# Patient Record
Sex: FEMALE | Race: WHITE | NOT HISPANIC OR LATINO | URBAN - NONMETROPOLITAN AREA
[De-identification: names, ages, dates, MRNs, and addresses within clinical notes are randomized per-mention and may not be internally consistent; named-entity substitution may affect disease eponyms.]

---

## 2023-09-03 ENCOUNTER — HOSPITAL ENCOUNTER (INPATIENT)
Facility: HOSPITAL | Age: 68
LOS: 4 days | Discharge: HOSPICE | DRG: 193 | End: 2023-09-07
Attending: EMERGENCY MEDICINE | Admitting: INTERNAL MEDICINE
Payer: COMMERCIAL

## 2023-09-03 ENCOUNTER — APPOINTMENT (OUTPATIENT)
Dept: CT IMAGING | Facility: HOSPITAL | Age: 68
DRG: 193 | End: 2023-09-03
Attending: EMERGENCY MEDICINE
Payer: COMMERCIAL

## 2023-09-03 DIAGNOSIS — J18.9 PNEUMONIA OF BOTH LUNGS DUE TO INFECTIOUS ORGANISM, UNSPECIFIED PART OF LUNG: ICD-10-CM

## 2023-09-03 LAB
ALBUMIN SERPL BCG-MCNC: 3.5 G/DL (ref 3.5–5.2)
ALP SERPL-CCNC: 101 U/L (ref 35–104)
ALT SERPL W P-5'-P-CCNC: 21 U/L (ref 0–50)
ANION GAP SERPL CALCULATED.3IONS-SCNC: 13 MMOL/L (ref 7–15)
AST SERPL W P-5'-P-CCNC: 26 U/L (ref 0–45)
BASE EXCESS BLDV CALC-SCNC: 1.5 MMOL/L (ref -7.7–1.9)
BASOPHILS # BLD AUTO: 0.1 10E3/UL (ref 0–0.2)
BASOPHILS NFR BLD AUTO: 1 %
BILIRUB SERPL-MCNC: 0.2 MG/DL
BUN SERPL-MCNC: 9.3 MG/DL (ref 8–23)
CALCIUM SERPL-MCNC: 9.4 MG/DL (ref 8.8–10.2)
CHLORIDE SERPL-SCNC: 100 MMOL/L (ref 98–107)
CREAT SERPL-MCNC: 0.88 MG/DL (ref 0.51–0.95)
DEPRECATED HCO3 PLAS-SCNC: 25 MMOL/L (ref 22–29)
EOSINOPHIL # BLD AUTO: 0.2 10E3/UL (ref 0–0.7)
EOSINOPHIL NFR BLD AUTO: 2 %
ERYTHROCYTE [DISTWIDTH] IN BLOOD BY AUTOMATED COUNT: 12.9 % (ref 10–15)
EST. AVERAGE GLUCOSE BLD GHB EST-MCNC: 166 MG/DL
GFR SERPL CREATININE-BSD FRML MDRD: 70 ML/MIN/1.73M2
GLUCOSE SERPL-MCNC: 214 MG/DL (ref 70–99)
HBA1C MFR BLD: 7.4 %
HCO3 BLDV-SCNC: 28 MMOL/L (ref 21–28)
HCT VFR BLD AUTO: 39.6 % (ref 35–47)
HGB BLD-MCNC: 13.3 G/DL (ref 11.7–15.7)
HOLD SPECIMEN: NORMAL
IMM GRANULOCYTES # BLD: 0.1 10E3/UL
IMM GRANULOCYTES NFR BLD: 1 %
INR PPP: 0.99 (ref 0.85–1.15)
LYMPHOCYTES # BLD AUTO: 0.9 10E3/UL (ref 0.8–5.3)
LYMPHOCYTES NFR BLD AUTO: 7 %
MCH RBC QN AUTO: 30.2 PG (ref 26.5–33)
MCHC RBC AUTO-ENTMCNC: 33.6 G/DL (ref 31.5–36.5)
MCV RBC AUTO: 90 FL (ref 78–100)
MONOCYTES # BLD AUTO: 0.4 10E3/UL (ref 0–1.3)
MONOCYTES NFR BLD AUTO: 3 %
NEUTROPHILS # BLD AUTO: 11.2 10E3/UL (ref 1.6–8.3)
NEUTROPHILS NFR BLD AUTO: 86 %
NRBC # BLD AUTO: 0 10E3/UL
NRBC BLD AUTO-RTO: 0 /100
NT-PROBNP SERPL-MCNC: 70 PG/ML (ref 0–900)
O2/TOTAL GAS SETTING VFR VENT: 28 %
OXYHGB MFR BLDV: 22 % (ref 70–75)
PCO2 BLDV: 53 MM HG (ref 40–50)
PH BLDV: 7.34 [PH] (ref 7.32–7.43)
PLATELET # BLD AUTO: 329 10E3/UL (ref 150–450)
PO2 BLDV: 18 MM HG (ref 25–47)
POTASSIUM SERPL-SCNC: 4.1 MMOL/L (ref 3.4–5.3)
PROT SERPL-MCNC: 7.1 G/DL (ref 6.4–8.3)
RBC # BLD AUTO: 4.4 10E6/UL (ref 3.8–5.2)
SARS-COV-2 RNA RESP QL NAA+PROBE: NEGATIVE
SODIUM SERPL-SCNC: 138 MMOL/L (ref 136–145)
TROPONIN T SERPL HS-MCNC: <6 NG/L
WBC # BLD AUTO: 12.8 10E3/UL (ref 4–11)

## 2023-09-03 PROCEDURE — 36415 COLL VENOUS BLD VENIPUNCTURE: CPT | Performed by: EMERGENCY MEDICINE

## 2023-09-03 PROCEDURE — 82805 BLOOD GASES W/O2 SATURATION: CPT | Performed by: EMERGENCY MEDICINE

## 2023-09-03 PROCEDURE — 96376 TX/PRO/DX INJ SAME DRUG ADON: CPT

## 2023-09-03 PROCEDURE — 250N000011 HC RX IP 250 OP 636: Mod: JZ | Performed by: INTERNAL MEDICINE

## 2023-09-03 PROCEDURE — 87899 AGENT NOS ASSAY W/OPTIC: CPT | Performed by: INTERNAL MEDICINE

## 2023-09-03 PROCEDURE — 250N000011 HC RX IP 250 OP 636: Performed by: EMERGENCY MEDICINE

## 2023-09-03 PROCEDURE — 85610 PROTHROMBIN TIME: CPT | Performed by: EMERGENCY MEDICINE

## 2023-09-03 PROCEDURE — 71275 CT ANGIOGRAPHY CHEST: CPT

## 2023-09-03 PROCEDURE — 96361 HYDRATE IV INFUSION ADD-ON: CPT

## 2023-09-03 PROCEDURE — 87635 SARS-COV-2 COVID-19 AMP PRB: CPT | Performed by: EMERGENCY MEDICINE

## 2023-09-03 PROCEDURE — 93005 ELECTROCARDIOGRAM TRACING: CPT

## 2023-09-03 PROCEDURE — 87040 BLOOD CULTURE FOR BACTERIA: CPT | Performed by: EMERGENCY MEDICINE

## 2023-09-03 PROCEDURE — 99285 EMERGENCY DEPT VISIT HI MDM: CPT | Mod: 25

## 2023-09-03 PROCEDURE — 258N000003 HC RX IP 258 OP 636: Performed by: EMERGENCY MEDICINE

## 2023-09-03 PROCEDURE — 85025 COMPLETE CBC W/AUTO DIFF WBC: CPT | Performed by: EMERGENCY MEDICINE

## 2023-09-03 PROCEDURE — 83880 ASSAY OF NATRIURETIC PEPTIDE: CPT | Performed by: EMERGENCY MEDICINE

## 2023-09-03 PROCEDURE — 93010 ELECTROCARDIOGRAM REPORT: CPT | Performed by: INTERNAL MEDICINE

## 2023-09-03 PROCEDURE — 99285 EMERGENCY DEPT VISIT HI MDM: CPT | Performed by: EMERGENCY MEDICINE

## 2023-09-03 PROCEDURE — 96374 THER/PROPH/DIAG INJ IV PUSH: CPT

## 2023-09-03 PROCEDURE — 120N000001 HC R&B MED SURG/OB

## 2023-09-03 PROCEDURE — 99222 1ST HOSP IP/OBS MODERATE 55: CPT | Mod: AI | Performed by: INTERNAL MEDICINE

## 2023-09-03 PROCEDURE — 83036 HEMOGLOBIN GLYCOSYLATED A1C: CPT | Performed by: INTERNAL MEDICINE

## 2023-09-03 PROCEDURE — 250N000013 HC RX MED GY IP 250 OP 250 PS 637: Performed by: INTERNAL MEDICINE

## 2023-09-03 PROCEDURE — 258N000003 HC RX IP 258 OP 636: Performed by: INTERNAL MEDICINE

## 2023-09-03 PROCEDURE — 80053 COMPREHEN METABOLIC PANEL: CPT | Performed by: EMERGENCY MEDICINE

## 2023-09-03 PROCEDURE — C9803 HOPD COVID-19 SPEC COLLECT: HCPCS

## 2023-09-03 PROCEDURE — 84484 ASSAY OF TROPONIN QUANT: CPT | Performed by: EMERGENCY MEDICINE

## 2023-09-03 RX ORDER — LIDOCAINE 40 MG/G
CREAM TOPICAL
Status: DISCONTINUED | OUTPATIENT
Start: 2023-09-03 | End: 2023-09-07 | Stop reason: HOSPADM

## 2023-09-03 RX ORDER — PRAVASTATIN SODIUM 10 MG
10 TABLET ORAL DAILY
Status: DISCONTINUED | OUTPATIENT
Start: 2023-09-04 | End: 2023-09-07 | Stop reason: HOSPADM

## 2023-09-03 RX ORDER — IOPAMIDOL 755 MG/ML
90 INJECTION, SOLUTION INTRAVASCULAR ONCE
Status: COMPLETED | OUTPATIENT
Start: 2023-09-03 | End: 2023-09-03

## 2023-09-03 RX ORDER — CEFTRIAXONE SODIUM 1 G/50ML
1 INJECTION, SOLUTION INTRAVENOUS EVERY 24 HOURS
Status: DISCONTINUED | OUTPATIENT
Start: 2023-09-04 | End: 2023-09-04

## 2023-09-03 RX ORDER — TRAZODONE HYDROCHLORIDE 100 MG/1
100 TABLET ORAL AT BEDTIME
Status: DISCONTINUED | OUTPATIENT
Start: 2023-09-03 | End: 2023-09-03

## 2023-09-03 RX ORDER — SODIUM CHLORIDE 9 MG/ML
INJECTION, SOLUTION INTRAVENOUS CONTINUOUS
Status: DISCONTINUED | OUTPATIENT
Start: 2023-09-03 | End: 2023-09-06

## 2023-09-03 RX ORDER — CLONAZEPAM 0.5 MG/1
0.5 TABLET ORAL AT BEDTIME
Status: ON HOLD | COMMUNITY
End: 2023-09-03

## 2023-09-03 RX ORDER — FUROSEMIDE 20 MG
1 TABLET ORAL
COMMUNITY
Start: 2023-01-24

## 2023-09-03 RX ORDER — ONDANSETRON 2 MG/ML
4 INJECTION INTRAMUSCULAR; INTRAVENOUS EVERY 6 HOURS PRN
Status: DISCONTINUED | OUTPATIENT
Start: 2023-09-03 | End: 2023-09-07 | Stop reason: HOSPADM

## 2023-09-03 RX ORDER — FUROSEMIDE 20 MG
20 TABLET ORAL DAILY
Status: DISCONTINUED | OUTPATIENT
Start: 2023-09-03 | End: 2023-09-07 | Stop reason: HOSPADM

## 2023-09-03 RX ORDER — PANTOPRAZOLE SODIUM 40 MG/1
40 TABLET, DELAYED RELEASE ORAL 2 TIMES DAILY
Status: DISCONTINUED | OUTPATIENT
Start: 2023-09-04 | End: 2023-09-07 | Stop reason: HOSPADM

## 2023-09-03 RX ORDER — KETOROLAC TROMETHAMINE 15 MG/ML
15 INJECTION, SOLUTION INTRAMUSCULAR; INTRAVENOUS EVERY 6 HOURS PRN
Status: DISCONTINUED | OUTPATIENT
Start: 2023-09-03 | End: 2023-09-07 | Stop reason: HOSPADM

## 2023-09-03 RX ORDER — TEMAZEPAM 30 MG
30 CAPSULE ORAL
COMMUNITY

## 2023-09-03 RX ORDER — LEVOTHYROXINE SODIUM 50 UG/1
50 TABLET ORAL
Status: DISCONTINUED | OUTPATIENT
Start: 2023-09-04 | End: 2023-09-07 | Stop reason: HOSPADM

## 2023-09-03 RX ORDER — HYDROXYZINE HYDROCHLORIDE 25 MG/1
50 TABLET, FILM COATED ORAL 4 TIMES DAILY PRN
COMMUNITY

## 2023-09-03 RX ORDER — LEVOTHYROXINE SODIUM 50 UG/1
1 TABLET ORAL
COMMUNITY
Start: 2023-03-06

## 2023-09-03 RX ORDER — LANOLIN ALCOHOL/MO/W.PET/CERES
1000 CREAM (GRAM) TOPICAL EVERY OTHER DAY
COMMUNITY

## 2023-09-03 RX ORDER — PRAVASTATIN SODIUM 10 MG
1 TABLET ORAL EVERY MORNING
COMMUNITY
Start: 2023-01-24

## 2023-09-03 RX ORDER — CLONAZEPAM 0.5 MG/1
0.5 TABLET ORAL AT BEDTIME
Status: DISCONTINUED | OUTPATIENT
Start: 2023-09-03 | End: 2023-09-03

## 2023-09-03 RX ORDER — VENLAFAXINE HYDROCHLORIDE 150 MG/1
2 CAPSULE, EXTENDED RELEASE ORAL
COMMUNITY
Start: 2023-08-30

## 2023-09-03 RX ORDER — OMEPRAZOLE 40 MG/1
40 CAPSULE, DELAYED RELEASE ORAL EVERY MORNING
COMMUNITY
Start: 2023-01-24

## 2023-09-03 RX ORDER — NICOTINE 21 MG/24HR
1 PATCH, TRANSDERMAL 24 HOURS TRANSDERMAL DAILY PRN
Status: DISCONTINUED | OUTPATIENT
Start: 2023-09-03 | End: 2023-09-07 | Stop reason: HOSPADM

## 2023-09-03 RX ORDER — PROPRANOLOL HYDROCHLORIDE 80 MG/1
80 CAPSULE, EXTENDED RELEASE ORAL DAILY
Status: DISCONTINUED | OUTPATIENT
Start: 2023-09-04 | End: 2023-09-07 | Stop reason: HOSPADM

## 2023-09-03 RX ORDER — PROPRANOLOL HYDROCHLORIDE 80 MG/1
80 CAPSULE, EXTENDED RELEASE ORAL EVERY MORNING
COMMUNITY
Start: 2023-01-24

## 2023-09-03 RX ORDER — ENOXAPARIN SODIUM 100 MG/ML
40 INJECTION SUBCUTANEOUS EVERY 24 HOURS
Status: DISCONTINUED | OUTPATIENT
Start: 2023-09-03 | End: 2023-09-07 | Stop reason: HOSPADM

## 2023-09-03 RX ORDER — VENLAFAXINE HYDROCHLORIDE 150 MG/1
300 CAPSULE, EXTENDED RELEASE ORAL DAILY
Status: DISCONTINUED | OUTPATIENT
Start: 2023-09-04 | End: 2023-09-07 | Stop reason: HOSPADM

## 2023-09-03 RX ORDER — FENTANYL CITRATE 50 UG/ML
50 INJECTION, SOLUTION INTRAMUSCULAR; INTRAVENOUS ONCE
Status: COMPLETED | OUTPATIENT
Start: 2023-09-03 | End: 2023-09-03

## 2023-09-03 RX ORDER — CLOMIPRAMINE HYDROCHLORIDE 50 MG/1
100 CAPSULE ORAL AT BEDTIME
COMMUNITY

## 2023-09-03 RX ORDER — TRAZODONE HYDROCHLORIDE 100 MG/1
100 TABLET ORAL AT BEDTIME
Status: ON HOLD | COMMUNITY
End: 2023-09-03

## 2023-09-03 RX ORDER — CEFTRIAXONE SODIUM 1 G/50ML
1 INJECTION, SOLUTION INTRAVENOUS ONCE
Status: COMPLETED | OUTPATIENT
Start: 2023-09-03 | End: 2023-09-03

## 2023-09-03 RX ORDER — CALCIUM CARBONATE/VITAMIN D3 600 MG-10
2 TABLET ORAL DAILY
Status: ON HOLD | COMMUNITY
End: 2023-09-05

## 2023-09-03 RX ORDER — ONDANSETRON 4 MG/1
4 TABLET, ORALLY DISINTEGRATING ORAL EVERY 6 HOURS PRN
Status: DISCONTINUED | OUTPATIENT
Start: 2023-09-03 | End: 2023-09-07 | Stop reason: HOSPADM

## 2023-09-03 RX ORDER — ACETAMINOPHEN 650 MG/1
650 SUPPOSITORY RECTAL EVERY 6 HOURS PRN
Status: DISCONTINUED | OUTPATIENT
Start: 2023-09-03 | End: 2023-09-07 | Stop reason: HOSPADM

## 2023-09-03 RX ORDER — ACETAMINOPHEN 325 MG/1
650 TABLET ORAL EVERY 6 HOURS PRN
Status: DISCONTINUED | OUTPATIENT
Start: 2023-09-03 | End: 2023-09-07 | Stop reason: HOSPADM

## 2023-09-03 RX ADMIN — SODIUM CHLORIDE: 9 INJECTION, SOLUTION INTRAVENOUS at 14:45

## 2023-09-03 RX ADMIN — KETOROLAC TROMETHAMINE 15 MG: 15 INJECTION, SOLUTION INTRAMUSCULAR; INTRAVENOUS at 19:31

## 2023-09-03 RX ADMIN — FENTANYL CITRATE 50 MCG: 50 INJECTION, SOLUTION INTRAMUSCULAR; INTRAVENOUS at 14:36

## 2023-09-03 RX ADMIN — ENOXAPARIN SODIUM 40 MG: 40 INJECTION SUBCUTANEOUS at 18:22

## 2023-09-03 RX ADMIN — IOPAMIDOL 90 ML: 755 INJECTION, SOLUTION INTRAVENOUS at 14:59

## 2023-09-03 RX ADMIN — ACETAMINOPHEN 650 MG: 325 TABLET, FILM COATED ORAL at 19:34

## 2023-09-03 RX ADMIN — CEFTRIAXONE SODIUM 1 G: 1 INJECTION, SOLUTION INTRAVENOUS at 17:07

## 2023-09-03 RX ADMIN — FENTANYL CITRATE 50 MCG: 50 INJECTION, SOLUTION INTRAMUSCULAR; INTRAVENOUS at 15:26

## 2023-09-03 RX ADMIN — AZITHROMYCIN 500 MG: 500 INJECTION, POWDER, LYOPHILIZED, FOR SOLUTION INTRAVENOUS at 18:15

## 2023-09-03 ASSESSMENT — ACTIVITIES OF DAILY LIVING (ADL)
ADLS_ACUITY_SCORE: 35
ADLS_ACUITY_SCORE: 35
ADLS_ACUITY_SCORE: 34
ADLS_ACUITY_SCORE: 34
ADLS_ACUITY_SCORE: 28

## 2023-09-03 ASSESSMENT — ENCOUNTER SYMPTOMS
NEUROLOGICAL NEGATIVE: 1
SHORTNESS OF BREATH: 1
ENDOCRINE NEGATIVE: 1
MUSCULOSKELETAL NEGATIVE: 1
HEMATOLOGIC/LYMPHATIC NEGATIVE: 1
GASTROINTESTINAL NEGATIVE: 1
EYES NEGATIVE: 1
FEVER: 1

## 2023-09-03 NOTE — ED TRIAGE NOTES
C/o left sided chest pain rated 9/10 that increases with coughing and deep breath. States coughing isn't more than usual due to being a smoker but the pain in her left chest makes it difficult to breathe and cough. Yells out and grabs left chest when coughing. SpO2 88% on RA.  mg and nitroglycerin SL given by EMS prior to arrival. Changed into gown. Cardiac monitor in place and shows a ST in the 100's. Reports she had a PE 15 years ago that required subcutaneous lovenox. Call light within reach.       Triage Assessment       Row Name 09/03/23 0829       Triage Assessment (Adult)    Airway WDL WDL       Respiratory WDL    Respiratory WDL X;rhythm/pattern    Rhythm/Pattern, Respiratory shortness of breath;tachypneic;shallow

## 2023-09-03 NOTE — H&P
[facility]    History and Physical  Hospitalist       Date of Admission:  9/3/2023  Date of Service (when I saw the patient): 09/03/23    Assessment & Plan   Tianna Lobato is a 68 year old female who presents with chest pain, shortness of breath    Community-acquired pneumonia: Resultant sepsis.  CTA chest with right-sided opacities, left-sided opacities as well as moderate pleural effusion.  COVID-negative.  Patient is a smoker, but no formal diagnosis of COPD has ever been made.  Tmax 100.7, WBC 12.8.  -Blood cultures drawn in the emergency department, will follow  -Empiric ceftriaxone, azithromycin  -Legionella, strep pneumo antigen  -Mycoplasma  -Supplemental oxygen as needed, wean as able  -Follow-up scan when improved, perhaps diagnostic thoracentesis on the left    Essential hypertension: Patient is on Inderal LA 80 mg.  -Continue as able    Bilateral lower leg swelling: Patient is on Lasix 20 mg daily  -We will hold currently in the setting of acute infection    Anxiety/depression: Continue home Effexor, clonazepam    Hyperlipidemia: Continue home statin    Hypothyroidism: Continue home Synthroid    Tobacco dependence: Nicotine replacement as needed    FEN: Oral diet as tolerated.  Electrolytes within normal limits.    Clinically Significant Risk Factors Present on Admission                                    DVT Prophylaxis: Enoxaparin (Lovenox) SQ    Code Status: Full Code    Disposition: Expected discharge in 2-3 days once clinically improved.    Kamini Roach MD, MD        Primary Care Physician   No primary care provider on file.    Chief Complaint   Chest/back pain    History is obtained from the patient    History of Present Illness   Tianna Lobato is a 68 year old female with history of hypertension, hyperlipidemia, hypothyroidism tobacco dependence, obesity, as well as pulmonary embolism about 10 years ago but no longer on anticoagulation who presents with chest pain and  shortness of breath for about 2 and half weeks.  Patient complains of back pain that is most prominent.  She admits to shortness of breath only on questioning.  She states that she is a smoker, less than a pack a day.  She does not have a formal diagnosis of COPD however.  She had trouble getting into the ambulance today because of shortness of breath.  In the emergency department, patient was found to be somewhat hypoxic 80% on room air.  Tmax measured here 100.7, and patient acknowledges measuring at home as high as 103.  She states that she got back from [facility] about a week ago.  With history of PE, CTA was gotten empirically which was negative for new pulmonary embolism.  It did show moderate left-sided effusion as well as opacities right upper and right lower lobes.  With mild hypoxia, patient admitted for possible community-acquired pneumonia.  She denies any confirmed sick contacts, although she did attend a large wedding a few days ago.      Past Medical History    I have reviewed this patient's medical history and updated it with pertinent information if needed.   No past medical history on file.    Past Surgical History   I have reviewed this patient's surgical history and updated it with pertinent information if needed.  No past surgical history on file.    Prior to Admission Medications   Prior to Admission Medications   Prescriptions Last Dose Informant Patient Reported? Taking?   clonazePAM (KLONOPIN) 0.5 MG tablet   Yes No   Sig: Take 0.5 mg by mouth At Bedtime   furosemide (LASIX) 20 MG tablet   Yes Yes   Sig: Take 1 tablet by mouth daily   levothyroxine (SYNTHROID/LEVOTHROID) 50 MCG tablet   Yes Yes   Sig: Take 1 tablet by mouth daily before breakfast   omeprazole (PRILOSEC) 40 MG DR capsule   Yes Yes   Sig: Take 40 mg by mouth daily   pravastatin (PRAVACHOL) 10 MG tablet   Yes Yes   Sig: Take 1 tablet by mouth daily   propranolol ER (INDERAL LA) 80 MG 24 hr capsule   Yes Yes   Sig: Take  80 mg by mouth daily   traZODone (DESYREL) 100 MG tablet   Yes No   Sig: Take 100 mg by mouth At Bedtime   venlafaxine (EFFEXOR XR) 150 MG 24 hr capsule   Yes Yes   Sig: Take 2 capsules by mouth daily      Facility-Administered Medications: None     Allergies   Allergies   Allergen Reactions    Sulfa Antibiotics Hives       Social History   I have reviewed this patient's social history and updated it with pertinent information if needed. Tianna Lobato      Family History   I have reviewed this patient's family history and updated it with pertinent information if needed.   No family history on file.    Review of Systems   The 10 point Review of Systems is negative other than noted in the HPI or here.     Physical Exam   Temp: (!) 100.7  F (38.2  C) Temp src: Tympanic BP: 133/71 Pulse: 111   Resp: 21 SpO2: (!) 91 % O2 Device: None (Room air) Oxygen Delivery: 2 LPM  Vital Signs with Ranges  Temp:  [100.7  F (38.2  C)] 100.7  F (38.2  C)  Pulse:  [] 111  Resp:  [21-26] 21  BP: (112-133)/(64-71) 133/71  SpO2:  [88 %-96 %] 91 %  196 lbs 6.88 oz    Constitutional: AA, NAD, obese  Eyes: PERRLA, no injection, no icterus  HEENT: atraumatic, normocephalic  Respiratory: relatively CTA b/l  Cardiovascular: S1 S2 RRR  GI: soft, NT, ND, + bowel sounds  Lymph/Hematologic: no palpable lymphadenopathy  Skin: no rashes, no lesions  Musculoskeletal: No LE edema, good tone, no deformities  Neurologic: oriented x 3, no focal deficits  Psychiatric: appropriate affect      Data   Data reviewed today:  I personally reviewed imaging reports.  Recent Labs   Lab 09/03/23  1407   WBC 12.8*   HGB 13.3   MCV 90      INR 0.99      POTASSIUM 4.1   CHLORIDE 100   CO2 25   BUN 9.3   CR 0.88   ANIONGAP 13   MESSI 9.4   *   ALBUMIN 3.5   PROTTOTAL 7.1   BILITOTAL 0.2   ALKPHOS 101   ALT 21   AST 26          No results found for this or any previous visit (from the past 24 hour(s)).

## 2023-09-03 NOTE — ED PROVIDER NOTES
"  History     Chief Complaint   Patient presents with    Chest Pain     HPI  Tianna Lobato is a 68 year old female who is transported by EMS complaining of left sided chest pain which started earlier today.  States the pain radiates into left back and left neck.  Has been happening intermittently for past few days.  Patient states thought it was a \"muscle spasm\".  Pain is very sharp.  Worse with cough and deep inspiration.  Patient also states has recently been short of breath.  Has a history of PE.  Denies dizziness or lightheadedness.  She denies abdominal pain.  She had no nausea or vomiting.  She has no change in her bowel or bladder habits.  Patient relates that she has developed some fevers and chills as well.    Allergies:  Allergies   Allergen Reactions    Sulfa Antibiotics Hives       Problem List:    Patient Active Problem List    Diagnosis Date Noted    Pneumonia of both lungs due to infectious organism, unspecified part of lung 09/03/2023     Priority: Medium        Past Medical History: Asthma, carpal tunnel syndrome, chest pain, back pain, depression, reflux, anxiety, breast cancer, pulmonary embolus, osteoarthritis, pernicious anemia, pulmonary nodule, reactive airway disease      Past Surgical History:    No past surgical history on file.    Family History:    No family history on file.    Social History:  Marital Status:   [2]        Medications:    furosemide (LASIX) 20 MG tablet  levothyroxine (SYNTHROID/LEVOTHROID) 50 MCG tablet  omeprazole (PRILOSEC) 40 MG DR capsule  pravastatin (PRAVACHOL) 10 MG tablet  propranolol ER (INDERAL LA) 80 MG 24 hr capsule  venlafaxine (EFFEXOR XR) 150 MG 24 hr capsule  clonazePAM (KLONOPIN) 0.5 MG tablet  traZODone (DESYREL) 100 MG tablet          Review of Systems   Constitutional:  Positive for fever.   HENT: Negative.     Eyes: Negative.    Respiratory:  Positive for shortness of breath.    Cardiovascular:  Positive for chest pain.   Gastrointestinal: " Negative.    Endocrine: Negative.    Genitourinary: Negative.    Musculoskeletal: Negative.    Neurological: Negative.    Hematological: Negative.    All other systems reviewed and found unremarkable    Physical Exam   BP: 112/64  Pulse: 103  Temp: (!) 100.7  F (38.2  C)  Resp: 26  Weight: 89.1 kg (196 lb 6.9 oz)  SpO2: (!) 88 %      Physical Exam 68-year-old female who is awake alert oriented person place and time.  She is pleasant and cooperative with my exam but does appear uncomfortable at rest.  HEENT normocephalic extraocular muscles intact pupils equally round and active light and oropharynx is clear.  neck supple full range of motion without pain.  Lungs are slightly diminished bilaterally.  No wheezes.  Heart maintains a regular rate and rhythm S1 and S2 sounds are appreciated.  The abdomen is soft no rebound or involuntary guarding.  Extremities a full range of motion 5/5 strength.  Brisk peripheral pulses brisk capillary refill no sensory deficit.  Neurologic exam cranial nerves II through XII are grossly intact.  No facial asymmetry.  No focal deficit.  Dermatologic exam no diffuse skin rashes or lesions are noted.    ED Course        EKG is obtained and interpreted by myself.  Shows a normal sinus rhythm.  Ventricular rate is 99 bpm.  MA interval is 142 ms.  Corrected QT is 459 ms.  There is no ST segment elevation or depression.  There is no inappropriate T wave inversion.  There does not appear to be evidence of acute ischemia      ED Course as of 09/03/23 1548   Sun Sep 03, 2023   1543 Stable throughout her stay in the emergency department.  I discussed lab and CT findings with the patient and her family.  I then discussed the case with Dr. Roach, call hospitalist who very graciously agreed to admit the patient to his service.                         Results for orders placed or performed during the hospital encounter of 09/03/23 (from the past 24 hour(s))   [place] Draw    Narrative    The following  orders were created for panel order [place] Draw.  Procedure                               Abnormality         Status                     ---------                               -----------         ------                     Extra Blue Top Tube[[id number]]                              Final result               Extra Red Top Tube[[id number]]                                                          Extra Green Top (Lithium...[[id number]]                      Final result               Extra Purple Top Tube[[id number]]                            Final result               Extra Green Top (Lithium...[[id number]]                      Final result                 Please view results for these tests on the individual orders.   Extra Blue Top Tube   Result Value Ref [facility]    Hold Specimen JIC    Extra Green Top (Lithium Heparin) Tube   Result Value Ref Range    Hold Specimen JIC    Extra Purple Top Tube   Result Value Ref [facility]    Hold Specimen JIC    Extra Green Top (Lithium Heparin) ON ICE   Result Value Ref [facility]    Hold Specimen JIC    CBC with platelets differential    Narrative    The following orders were created for panel order CBC with platelets differential.  Procedure                               Abnormality         Status                     ---------                               -----------         ------                     CBC with platelets and d...[[id number]]  Abnormal            Final result                 Please view results for these tests on the individual orders.   INR   Result Value Ref [facility]    INR 0.99 0.85 - 1.15   Comprehensive metabolic panel   Result Value Ref [facility]    Sodium 138 136 - 145 mmol/L    Potassium 4.1 3.4 - 5.3 mmol/L    Chloride 100 98 - 107 mmol/L    Carbon Dioxide (CO2) 25 22 - 29 mmol/L    Anion Gap 13 7 - 15 mmol/L    Urea Nitrogen 9.3 8.0 - 23.0 mg/dL    Creatinine 0.88 0.51 - 0.95 mg/dL    Calcium 9.4 8.8 - 10.2 mg/dL    Glucose 214 (H) 70 - 99 mg/dL    Alkaline Phosphatase 101  35 - 104 U/L    AST 26 0 - 45 U/L    ALT 21 0 - 50 U/L    Protein Total 7.1 6.4 - 8.3 g/dL    Albumin 3.5 3.5 - 5.2 g/dL    Bilirubin Total 0.2 <=1.2 mg/dL    GFR Estimate 70 >60 mL/min/1.73m2   Troponin T, High Sensitivity   Result Value Ref [facility]    Troponin T, High Sensitivity <6 <=14 ng/L   Nt probnp inpatient (BNP)   Result Value Ref Range    N terminal Pro BNP Inpatient 70 0 - 900 pg/mL   CBC with platelets and differential   Result Value Ref Range    WBC Count 12.8 (H) 4.0 - 11.0 10e3/uL    RBC Count 4.40 3.80 - 5.20 10e6/uL    Hemoglobin 13.3 11.7 - 15.7 g/dL    Hematocrit 39.6 35.0 - 47.0 %    MCV 90 78 - 100 fL    MCH 30.2 26.5 - 33.0 pg    MCHC 33.6 31.5 - 36.5 g/dL    RDW 12.9 10.0 - 15.0 %    Platelet Count 329 150 - 450 10e3/uL    % Neutrophils 86 %    % Lymphocytes 7 %    % Monocytes 3 %    % Eosinophils 2 %    % Basophils 1 %    % Immature Granulocytes 1 %    NRBCs per 100 WBC 0 <1 /100    Absolute Neutrophils 11.2 (H) 1.6 - 8.3 10e3/uL    Absolute Lymphocytes 0.9 0.8 - 5.3 10e3/uL    Absolute Monocytes 0.4 0.0 - 1.3 10e3/uL    Absolute Eosinophils 0.2 0.0 - 0.7 10e3/uL    Absolute Basophils 0.1 0.0 - 0.2 10e3/uL    Absolute Immature Granulocytes 0.1 <=0.4 10e3/uL    Absolute NRBCs 0.0 10e3/uL   Blood gas venous and oxyhgb   Result Value Ref [facility]    pH Venous 7.34 7.32 - 7.43    pCO2 Venous 53 (H) 40 - 50 mm Hg    pO2 Venous 18 (L) 25 - 47 mm Hg    Bicarbonate Venous 28 21 - 28 mmol/L    FIO2 28     Oxyhemoglobin Venous 22 (L) 70 - 75 %    Base Excess/Deficit 1.5 -7.7 - 1.9 mmol/L   Symptomatic COVID-19 Virus (Coronavirus) by PCR Nasopharyngeal    Specimen: Nasopharyngeal; Swab   Result Value Ref [facility]    SARS CoV2 PCR Negative Negative    Narrative    Testing was performed using the Xpert Xpress SARS-CoV-2 Assay on the Cepheid Gene-Xpert Instrument Systems. Additional information about this Emergency Use Authorization (EUA) assay can be found via the Lab Guide. This test should be ordered for the  detection of SARS-CoV-2 in individuals who meet SARS-CoV-2 clinical and/or epidemiological criteria as well as from individuals without symptoms or other reasons to suspect COVID-19. Test performance for asymptomatic patients has only been established in anterior nasal swab specimens. This test is for in vitro diagnostic use under the FDA EUA for laboratories certified under CLIA to perform high complexity testing. This test has not been FDA cleared or approved. A negative result does not rule out the presence of PCR inhibitors in the specimen or target RNA concentration below the limit of detection for the assay. The possibility of a false negative should be considered if the patient's recent exposure or clinical presentation suggests COVID-19. This test was validated by [facility] laboratory. This laboratory is certified under the Clinical Laboratory Improvement Amendments (CLIA) as qualified to perform high complexity testing.       Medications   sodium chloride 0.9% infusion ( Intravenous $New Bag 9/3/23 1445)   cefTRIAXone in d5w (ROCEPHIN) intermittent infusion 1 g (has no administration in time range)   fentaNYL (PF) (SUBLIMAZE) injection 50 mcg (50 mcg Intravenous $Given 9/3/23 1436)   sodium chloride (PF) 0.9% PF flush 50 mL (50 mLs Intravenous $Given 9/3/23 1458)   iopamidol (ISOVUE-370) solution 90 mL (90 mLs Intravenous $Given 9/3/23 1459)   fentaNYL (PF) (SUBLIMAZE) injection 50 mcg (50 mcg Intravenous $Given 9/3/23 1526)       Assessments & Plan (with Medical Decision Making)     I have reviewed the nursing notes.    The plan is to admit the patient to the hospitalist service of Dr. Roach          Medical Decision Making  The patient's presentation was of moderate complexity (an acute illness with systemic symptoms).    The patient's evaluation involved:  ordering and/or review of 3+ test(s) in this encounter (see separate area of note for details)    The patient's management  necessitated high risk (a decision regarding hospitalization).        New Prescriptions    No medications on file       Final diagnoses:   Pneumonia of both lungs due to infectious organism, unspecified part of lung       9/3/2023   [facility]       Nikita Reno MD  09/03/23 8164

## 2023-09-03 NOTE — PHARMACY-MEDICATION REGIMEN REVIEW
Pharmacy Antimicrobial Stewardship Assessment     Current Antimicrobial Therapy:  Anti-infectives (From now, onward)      Start     Dose/Rate Route Frequency Ordered Stop    23 1700  cefTRIAXone in d5w (ROCEPHIN) intermittent infusion 1 g         1 g  over 30 Minutes Intravenous EVERY 24 HOURS 23 1718      23 1730  azithromycin (ZITHROMAX) 500 mg in sodium chloride 0.9 % 250 mL intermittent infusion         500 mg  over 1 Hours Intravenous EVERY 24 HOURS 23 171              Indication: CAP    Days of Therapy: 1     Pertinent Labs:  Recent Labs   Lab Test 23  1407   WBC 12.8*     No lab results found.    Invalid input(s): RATE, ESR     Temperature:  Temp (24hrs), Av.7  F (38.2  C), Min:100.4  F (38  C), Max:101.1  F (38.4  C)    Culture Results:   9/3: Blood = in process  9/3: COVID = negative    Recommendations/Interventions:  No recommendations at this time. Will continue to monitor.     Astrid Wallace RPH  September 3, 2023

## 2023-09-04 LAB
ANION GAP SERPL CALCULATED.3IONS-SCNC: 13 MMOL/L (ref 7–15)
BUN SERPL-MCNC: 8.5 MG/DL (ref 8–23)
CALCIUM SERPL-MCNC: 8.8 MG/DL (ref 8.8–10.2)
CHLORIDE SERPL-SCNC: 101 MMOL/L (ref 98–107)
CREAT SERPL-MCNC: 0.77 MG/DL (ref 0.51–0.95)
CRP SERPL-MCNC: 222.49 MG/L
DEPRECATED HCO3 PLAS-SCNC: 25 MMOL/L (ref 22–29)
ERYTHROCYTE [DISTWIDTH] IN BLOOD BY AUTOMATED COUNT: 12.9 % (ref 10–15)
GFR SERPL CREATININE-BSD FRML MDRD: 83 ML/MIN/1.73M2
GLUCOSE SERPL-MCNC: 135 MG/DL (ref 70–99)
HCT VFR BLD AUTO: 37.1 % (ref 35–47)
HGB BLD-MCNC: 12.2 G/DL (ref 11.7–15.7)
L PNEUMO1 AG UR QL IA: NEGATIVE
LACTATE SERPL-SCNC: 1.7 MMOL/L (ref 0.7–2)
MCH RBC QN AUTO: 30 PG (ref 26.5–33)
MCHC RBC AUTO-ENTMCNC: 32.9 G/DL (ref 31.5–36.5)
MCV RBC AUTO: 91 FL (ref 78–100)
PLATELET # BLD AUTO: 376 10E3/UL (ref 150–450)
POTASSIUM SERPL-SCNC: 3.6 MMOL/L (ref 3.4–5.3)
PROCALCITONIN SERPL IA-MCNC: 0.36 NG/ML
RBC # BLD AUTO: 4.07 10E6/UL (ref 3.8–5.2)
S PNEUM AG SPEC QL: NEGATIVE
SODIUM SERPL-SCNC: 139 MMOL/L (ref 136–145)
WBC # BLD AUTO: 19.1 10E3/UL (ref 4–11)

## 2023-09-04 PROCEDURE — 250N000011 HC RX IP 250 OP 636: Mod: JZ | Performed by: INTERNAL MEDICINE

## 2023-09-04 PROCEDURE — 999N000157 HC STATISTIC RCP TIME EA 10 MIN

## 2023-09-04 PROCEDURE — 36415 COLL VENOUS BLD VENIPUNCTURE: CPT | Performed by: INTERNAL MEDICINE

## 2023-09-04 PROCEDURE — 84145 PROCALCITONIN (PCT): CPT | Performed by: INTERNAL MEDICINE

## 2023-09-04 PROCEDURE — 250N000013 HC RX MED GY IP 250 OP 250 PS 637: Performed by: INTERNAL MEDICINE

## 2023-09-04 PROCEDURE — 250N000009 HC RX 250: Performed by: INTERNAL MEDICINE

## 2023-09-04 PROCEDURE — 83605 ASSAY OF LACTIC ACID: CPT | Performed by: INTERNAL MEDICINE

## 2023-09-04 PROCEDURE — 258N000003 HC RX IP 258 OP 636: Performed by: INTERNAL MEDICINE

## 2023-09-04 PROCEDURE — 99232 SBSQ HOSP IP/OBS MODERATE 35: CPT | Performed by: INTERNAL MEDICINE

## 2023-09-04 PROCEDURE — 120N000001 HC R&B MED SURG/OB

## 2023-09-04 PROCEDURE — 82310 ASSAY OF CALCIUM: CPT | Performed by: INTERNAL MEDICINE

## 2023-09-04 PROCEDURE — 86140 C-REACTIVE PROTEIN: CPT | Performed by: INTERNAL MEDICINE

## 2023-09-04 PROCEDURE — 94640 AIRWAY INHALATION TREATMENT: CPT

## 2023-09-04 PROCEDURE — 85014 HEMATOCRIT: CPT | Performed by: INTERNAL MEDICINE

## 2023-09-04 RX ORDER — CEFEPIME HYDROCHLORIDE 2 G/1
2 INJECTION, POWDER, FOR SOLUTION INTRAVENOUS EVERY 12 HOURS
Status: DISCONTINUED | OUTPATIENT
Start: 2023-09-04 | End: 2023-09-04

## 2023-09-04 RX ORDER — NALOXONE HYDROCHLORIDE 0.4 MG/ML
0.4 INJECTION, SOLUTION INTRAMUSCULAR; INTRAVENOUS; SUBCUTANEOUS
Status: DISCONTINUED | OUTPATIENT
Start: 2023-09-04 | End: 2023-09-07 | Stop reason: HOSPADM

## 2023-09-04 RX ORDER — HYDROXYZINE HYDROCHLORIDE 25 MG/1
50 TABLET, FILM COATED ORAL EVERY 4 HOURS PRN
Status: DISCONTINUED | OUTPATIENT
Start: 2023-09-04 | End: 2023-09-07 | Stop reason: HOSPADM

## 2023-09-04 RX ORDER — CEFEPIME HYDROCHLORIDE 2 G/1
2 INJECTION, POWDER, FOR SOLUTION INTRAVENOUS EVERY 8 HOURS
Status: DISCONTINUED | OUTPATIENT
Start: 2023-09-04 | End: 2023-09-05

## 2023-09-04 RX ORDER — HYDROMORPHONE HYDROCHLORIDE 1 MG/ML
0.5 INJECTION, SOLUTION INTRAMUSCULAR; INTRAVENOUS; SUBCUTANEOUS EVERY 4 HOURS PRN
Status: DISCONTINUED | OUTPATIENT
Start: 2023-09-04 | End: 2023-09-07 | Stop reason: HOSPADM

## 2023-09-04 RX ORDER — LIDOCAINE 4 G/G
2 PATCH TOPICAL
Status: DISCONTINUED | OUTPATIENT
Start: 2023-09-04 | End: 2023-09-07 | Stop reason: HOSPADM

## 2023-09-04 RX ORDER — TEMAZEPAM 15 MG/1
30 CAPSULE ORAL
Status: DISCONTINUED | OUTPATIENT
Start: 2023-09-04 | End: 2023-09-07 | Stop reason: HOSPADM

## 2023-09-04 RX ORDER — IPRATROPIUM BROMIDE AND ALBUTEROL SULFATE 2.5; .5 MG/3ML; MG/3ML
3 SOLUTION RESPIRATORY (INHALATION)
Status: DISCONTINUED | OUTPATIENT
Start: 2023-09-05 | End: 2023-09-07 | Stop reason: HOSPADM

## 2023-09-04 RX ORDER — NALOXONE HYDROCHLORIDE 0.4 MG/ML
0.2 INJECTION, SOLUTION INTRAMUSCULAR; INTRAVENOUS; SUBCUTANEOUS
Status: DISCONTINUED | OUTPATIENT
Start: 2023-09-04 | End: 2023-09-07 | Stop reason: HOSPADM

## 2023-09-04 RX ORDER — ALBUTEROL SULFATE 0.83 MG/ML
2.5 SOLUTION RESPIRATORY (INHALATION)
Status: DISCONTINUED | OUTPATIENT
Start: 2023-09-04 | End: 2023-09-07 | Stop reason: HOSPADM

## 2023-09-04 RX ORDER — CLOMIPRAMINE HYDROCHLORIDE 25 MG/1
50 CAPSULE ORAL AT BEDTIME
Status: DISCONTINUED | OUTPATIENT
Start: 2023-09-04 | End: 2023-09-06

## 2023-09-04 RX ADMIN — CEFEPIME 2 G: 2 INJECTION, POWDER, FOR SOLUTION INTRAVENOUS at 17:47

## 2023-09-04 RX ADMIN — HYDROMORPHONE HYDROCHLORIDE 0.5 MG: 1 INJECTION, SOLUTION INTRAMUSCULAR; INTRAVENOUS; SUBCUTANEOUS at 16:06

## 2023-09-04 RX ADMIN — HYDROMORPHONE HYDROCHLORIDE 0.5 MG: 1 INJECTION, SOLUTION INTRAMUSCULAR; INTRAVENOUS; SUBCUTANEOUS at 22:01

## 2023-09-04 RX ADMIN — CLOMIPRAMINE HYDROCHLORIDE 50 MG: 25 CAPSULE ORAL at 21:04

## 2023-09-04 RX ADMIN — ACETAMINOPHEN 650 MG: 325 TABLET, FILM COATED ORAL at 16:09

## 2023-09-04 RX ADMIN — ACETAMINOPHEN 650 MG: 325 TABLET, FILM COATED ORAL at 07:50

## 2023-09-04 RX ADMIN — VANCOMYCIN HYDROCHLORIDE 2000 MG: 5 INJECTION, POWDER, LYOPHILIZED, FOR SOLUTION INTRAVENOUS at 10:22

## 2023-09-04 RX ADMIN — KETOROLAC TROMETHAMINE 15 MG: 15 INJECTION, SOLUTION INTRAMUSCULAR; INTRAVENOUS at 16:10

## 2023-09-04 RX ADMIN — PROPRANOLOL HYDROCHLORIDE 80 MG: 80 CAPSULE, EXTENDED RELEASE ORAL at 09:40

## 2023-09-04 RX ADMIN — PANTOPRAZOLE SODIUM 40 MG: 40 TABLET, DELAYED RELEASE ORAL at 21:03

## 2023-09-04 RX ADMIN — HYDROXYZINE HYDROCHLORIDE 50 MG: 25 TABLET, FILM COATED ORAL at 21:34

## 2023-09-04 RX ADMIN — KETOROLAC TROMETHAMINE 15 MG: 15 INJECTION, SOLUTION INTRAMUSCULAR; INTRAVENOUS at 01:29

## 2023-09-04 RX ADMIN — HYDROMORPHONE HYDROCHLORIDE 0.5 MG: 1 INJECTION, SOLUTION INTRAMUSCULAR; INTRAVENOUS; SUBCUTANEOUS at 09:30

## 2023-09-04 RX ADMIN — CEFEPIME 2 G: 2 INJECTION, POWDER, FOR SOLUTION INTRAVENOUS at 09:35

## 2023-09-04 RX ADMIN — KETOROLAC TROMETHAMINE 15 MG: 15 INJECTION, SOLUTION INTRAMUSCULAR; INTRAVENOUS at 21:58

## 2023-09-04 RX ADMIN — KETOROLAC TROMETHAMINE 15 MG: 15 INJECTION, SOLUTION INTRAMUSCULAR; INTRAVENOUS at 09:33

## 2023-09-04 RX ADMIN — VENLAFAXINE HYDROCHLORIDE 300 MG: 150 CAPSULE, EXTENDED RELEASE ORAL at 09:40

## 2023-09-04 RX ADMIN — PRAVASTATIN SODIUM 10 MG: 10 TABLET ORAL at 09:40

## 2023-09-04 RX ADMIN — TEMAZEPAM 30 MG: 15 CAPSULE ORAL at 21:34

## 2023-09-04 RX ADMIN — AZITHROMYCIN 500 MG: 500 INJECTION, POWDER, LYOPHILIZED, FOR SOLUTION INTRAVENOUS at 16:17

## 2023-09-04 RX ADMIN — ACETAMINOPHEN 650 MG: 325 TABLET, FILM COATED ORAL at 01:29

## 2023-09-04 RX ADMIN — LEVOTHYROXINE SODIUM 50 MCG: 0.05 TABLET ORAL at 07:50

## 2023-09-04 RX ADMIN — PANTOPRAZOLE SODIUM 40 MG: 40 TABLET, DELAYED RELEASE ORAL at 09:40

## 2023-09-04 RX ADMIN — ALBUTEROL SULFATE 2.5 MG: 2.5 SOLUTION RESPIRATORY (INHALATION) at 22:54

## 2023-09-04 ASSESSMENT — ACTIVITIES OF DAILY LIVING (ADL)
ADLS_ACUITY_SCORE: 30
ADLS_ACUITY_SCORE: 32
ADLS_ACUITY_SCORE: 34
ADLS_ACUITY_SCORE: 32
ADLS_ACUITY_SCORE: 30
ADLS_ACUITY_SCORE: 30
ADLS_ACUITY_SCORE: 32
ADLS_ACUITY_SCORE: 30

## 2023-09-04 NOTE — PLAN OF CARE
Free from falls/injuries this shift, Transfers with gait belt, walker and assist of 2. Daughter stated pt can be unsteady on feet. Denies falls in past 6 months. Reports pain in L) neck, spine, shoulder. Also reports pain with deep inspiration. Has been taking Ibuprofen at home for this pain. Ua obtained and sent to lab. Temp max: 101.9. Med with tylenol. O2 applied at 1l NC. Refuses nicotine patch.     Face to face report given with opportunity to observe patient.    Report given to Nadine Garcia RN   9/3/2023  10:44 PM

## 2023-09-04 NOTE — PROVIDER NOTIFICATION
Notified Dr. Roach-  Patient c/o pain, toradol and tylenol given over night did not give any relief per patient.         Provider in room to see patient, ordered PRN 0.5 mg Dilaudid.    Sailaja Hutton RN on 9/4/2023 at 8:13 AM

## 2023-09-04 NOTE — PLAN OF CARE
"Goal Outcome Evaluation: Not Progressing     VSS. Afebrile. O2: NC 1 LPM. LOC x 4. Infrequent, non-productive cough. Pt had breakthrough pain during the night. She was moved to her recliner because she could not get comfortable in her bed. After administration of IV Toradol & PO Tylenol pt did nap on & off in her recliner. Lungs: Bases: pleural rub with shallow breaths due to pain when trying to deep breathe. Pt stated, \"it feels like something is rubbing against my ribs.\" Toradol & Tylenol helped somewhat but pain was still evident. Once again pt was repositioned in her chair. She seems comfortable at the moment. IV flushed & SL. Assist, stand by. No nicotine patch as pt refused. Call light/personal items within reach, use of call light appropriately, makes needs known, & safety checks done.   Face to face report given with opportunity to observe patient.    Report given to SEAN Rivera RN   9/4/2023  7:21 AM                         "

## 2023-09-04 NOTE — PHARMACY-MEDICATION REGIMEN REVIEW
Pharmacy Antimicrobial Stewardship Assessment     Current Antimicrobial Therapy:  Anti-infectives (From now, onward)      Start     Dose/Rate Route Frequency Ordered Stop    23 0900  ceFEPIme (MAXIPIME) 2 g vial to attach to  mL bag for ADULTS or 50 mL bag for PEDS         2 g  over 30 Minutes Intravenous EVERY 12 HOURS 23 0810      23 0900  vancomycin (VANCOCIN) 2,000 mg in 0.9% NaCl 500 mL intermittent infusion         2,000 mg  over 2 Hours Intravenous EVERY 24 HOURS 23 0819      23 1730  azithromycin (ZITHROMAX) 500 mg in sodium chloride 0.9 % 250 mL intermittent infusion         500 mg  over 1 Hours Intravenous EVERY 24 HOURS 23 1718              Indication: CAP (Cefepime, Vanco, Azithromycin), Sepsis (Cefepime, Vanco)     Days of Therapy:   Azithromycin: Day 2  Cefepime & Vanco: Day 1     Pertinent Labs:  Recent Labs   Lab Test 23  0524 23  1407   WBC 19.1* 12.8*     Recent Labs   Lab Test 23  0524   PCAL 0.36*        Temperature:  Temp (24hrs), Av.5  F (38.1  C), Min:98.8  F (37.1  C), Max:101.9  F (38.8  C)    Culture Results:   9/3: Legionella pneumophila antigen, urine = in process  9/3: Strep pneumoniae antigen, urine = in process  9/3: Blood = no growth  9/3: COVID = negative    Cultures to be Collected:          Recommendations/Interventions:  Cefepime is a restricted antibiotic and should be reserved for cases when the first-line, unrestricted agents cannot be used. Recommend switching cefepime to Zosyn. In the meantime, the patient's CrCl has improved from 59.9 to 68.5 mL/min. Renally dose adjusting cefepime to 2g q8h.     Astrid Wallace [facility]  2023

## 2023-09-04 NOTE — PLAN OF CARE
Up to BS. Reports 8/10 pain to neck, ribs, back for 2+ weeks at home. Reports she has been taking Ibuprofen for the pain.

## 2023-09-04 NOTE — PROGRESS NOTES
"[facility]    Hospitalist Progress Note    Date of Service (when I saw the patient): 09/04/2023    Assessment & Plan   Tianna Lobato is a 68 year old female who was admitted on 9/3/2023.     Community-acquired pneumonia: Resultant sepsis.  CTA chest with right-sided opacities, left-sided opacities as well as moderate lleft pleural effusion.  COVID-negative.  Patient is a smoker, but no formal diagnosis of COPD has ever been made.  Tmax 100.7 on admission, WBC 12.8.  Blood cultures drawn in the emergency department. Legionella, strep pneumo antigen pending.  -9/4: With persistent symptoms and worsening inflammatory markers, will switch ceftriaxone to cefepime, add vancomycin.  Continue azithromycin.  Blood cultures no growth to date.  Will order ultrasound-guided thoracentesis with studies on left effusion    Essential hypertension: Patient is on Inderal LA 80 mg.  -Continue as able     Bilateral lower leg swelling: Patient is on Lasix 20 mg daily  -We will hold currently in the setting of acute infection     Anxiety/depression: Continue home Effexor, clonazepam     Hyperlipidemia: Continue home statin     Hypothyroidism: Continue home Synthroid     Tobacco dependence: Nicotine replacement as needed     FEN: Oral diet as tolerated.  Electrolytes within normal limits.    Clinically Significant Risk Factors Present on Admission                    # Acute Respiratory Failure: Documented O2 saturation < 91%.  Continue supplemental oxygen as needed    # DMII: A1C = 7.4 % (Ref range: <5.7 %) within past 6 months    # Obesity: Estimated body mass index is 36.53 kg/m  as calculated from the following:    Height as of this encounter: 1.549 m (5' 1\").    Weight as of this encounter: 87.7 kg (193 lb 5.5 oz).              DVT Prophylaxis: Enoxaparin (Lovenox) subcutaneous, holding for thoracentesis    Code Status: Full Code    Disposition: Expected discharge in 2-3 days once clinically improved.    Kamini Roach, " MD, MD        Interval History   Patient seen in room.  Patient sitting at edge of bed, states that pleuritic pain still persistent.  Wrapped in blankets, feels chilled.  Tmax overnight one 1.9.    -Data reviewed today: I reviewed all new labs and imaging results over the last 24 hours. I personally reviewed imaging reports.    Physical Exam   Temp: 100.2  F (37.9  C) Temp src: Tympanic BP: 113/63 Pulse: 95   Resp: 24 SpO2: 96 % O2 Device: Nasal cannula Oxygen Delivery: 1 LPM  Vitals:    09/03/23 1353 09/03/23 1729 09/04/23 0315   Weight: 89.1 kg (196 lb 6.9 oz) 88.4 kg (194 lb 14.2 oz) 87.7 kg (193 lb 5.5 oz)     Vital Signs with Ranges  Temp:  [98.8  F (37.1  C)-101.9  F (38.8  C)] 100.2  F (37.9  C)  Pulse:  [] 95  Resp:  [20-26] 24  BP: ()/(52-75) 113/63  SpO2:  [71 %-100 %] 96 %    Intake/Output Summary (Last 24 hours) at 9/4/2023 0837  Last data filed at 9/4/2023 0637  Gross per 24 hour   Intake 3 ml   Output 550 ml   Net -547 ml       Peripheral IV 09/03/23 Left Upper forearm (Active)   Site Assessment WDL 09/04/23 0748   Line Status Saline locked 09/04/23 0748   Dressing Transparent 09/04/23 0748   Dressing Status clean;dry;intact 09/04/23 0748   Dressing Intervention New dressing  09/03/23 1418   Line Intervention Flushed 09/04/23 0748   Phlebitis Scale 0-->no symptoms 09/04/23 0748   Number of days: 1     No line/device    Constitutional: AA, NAD, obese  Eyes: PERRLA, no injection, no icterus  HEENT: atraumatic, normocephalic  Respiratory: relatively CTA b/l  Cardiovascular: S1 S2 RRR  GI: soft, NT, ND, + bowel sounds  Lymph/Hematologic: no palpable lymphadenopathy  Skin: no rashes, no lesions  Musculoskeletal: No LE edema, good tone, no deformities  Neurologic: oriented x 3, no focal deficits  Psychiatric: appropriate affect         Medications    sodium chloride Stopped (09/03/23 1155)      azithromycin  500 mg Intravenous Q24H    ceFEPIme  2 g Intravenous Q12H    [Held by provider]  enoxaparin ANTICOAGULANT  40 mg Subcutaneous Q24H    [Held by provider] furosemide  20 mg Oral Daily    levothyroxine  50 mcg Oral QAM AC    lidocaine  2 patch Transdermal Q24h    nicotine   Transdermal Q8H    pantoprazole  40 mg Oral BID    pravastatin  10 mg Oral Daily    propranolol ER  80 mg Oral Daily    sodium chloride (PF)  3 mL Intracatheter Q8H    vancomycin  2,000 mg Intravenous Q24H    venlafaxine  300 mg Oral Daily       Data   Recent Labs   Lab 09/04/23  0524 09/03/23  1407   WBC 19.1* 12.8*   HGB 12.2 13.3   MCV 91 90    329   INR  --  0.99    138   POTASSIUM 3.6 4.1   CHLORIDE 101 100   CO2 25 25   BUN 8.5 9.3   CR 0.77 0.88   ANIONGAP 13 13   MESSI 8.8 9.4   * 214*   ALBUMIN  --  3.5   PROTTOTAL  --  7.1   BILITOTAL  --  0.2   ALKPHOS  --  101   ALT  --  21   AST  --  26          No results found for this or any previous visit (from the past 24 hour(s)).    Kamini Roach MD

## 2023-09-04 NOTE — PHARMACY-VANCOMYCIN DOSING SERVICE
Pharmacy Vancomycin Initial Note  Date of Service 2023  Patient's  1955  68 year old, female    Indication: Community Acquired Pneumonia and Sepsis    Current estimated CrCl = Estimated Creatinine Clearance: 68.5 mL/min (based on SCr of 0.77 mg/dL).    Creatinine for last 3 days  9/3/2023:  2:07 PM Creatinine 0.88 mg/dL  2023:  5:24 AM Creatinine 0.77 mg/dL    Recent Vancomycin Level(s) for last 3 days  No results found for requested labs within last 3 days.      Vancomycin IV Administrations (past 72 hours)        No vancomycin orders with administrations in past 72 hours.                    Nephrotoxins and other renal medications (From now, onward)      Start     Dose/Rate Route Frequency Ordered Stop    23 0900  vancomycin (VANCOCIN) 2,000 mg in 0.9% NaCl 500 mL intermittent infusion         2,000 mg  over 2 Hours Intravenous EVERY 24 HOURS 23 0819      23 1730  [Held by provider]  furosemide (LASIX) tablet 20 mg        (Held by provider since Sun 9/3/2023 at 1718 by Nirali Garcia, RN.Hold Reason: Other)   Note to Pharmacy: PTA Sig:Take 1 tablet by mouth daily      20 mg Oral DAILY 23 1718  ketorolac (TORADOL) injection 15 mg         15 mg Intravenous EVERY 6 HOURS PRN 23 1718 23 1717            Contrast Orders - past 72 hours (72h ago, onward)      Start     Dose/Rate Route Frequency Stop    23 1500  iopamidol (ISOVUE-370) solution 90 mL         90 mL Intravenous ONCE 23 1459            [employer] Prediction of Planned Initial Vancomycin Regimen  Loading dose: 2000 mg at 09:00 2023.  Regimen: 2000 mg IV every 24 hours.  Start time: 08:20 on 2023  Exposure target: AUC24 (range)400-600 mg/L.hr   AUC24,ss: 537 mg/L.hr  Probability of AUC24 > 400: 80 %  Ctrough,ss: 14.2 mg/L  Probability of Ctrough,ss > 20: 25 %  Probability of nephrotoxicity (Lodise SLIM ): 9 %          Plan:  Start vancomycin 2000 mg IV q24h.    Vancomycin monitoring method: AUC  Vancomycin therapeutic monitoring goal: 400-600 mg*h/L  Pharmacy will check vancomycin levels as appropriate in 1-3 Days.    Serum creatinine levels will be ordered daily for the first week of therapy and at least twice weekly for subsequent weeks.      Astrid Wallace RPH

## 2023-09-04 NOTE — PLAN OF CARE
"Most recent vitals: /57 (BP Location: Left arm)   Pulse 85   Temp 98.8  F (37.1  C) (Tympanic)   Resp 24   Ht 1.549 m (5' 1\")   Wt 87.7 kg (193 lb 5.5 oz)   SpO2 95%   BMI 36.53 kg/m      Comments:    A/O, calm, cooperative. Pain 10/10 beginning of shift, patient received 0.5 mg dilaudid, 15 mg Toradol, and 650 mg tylenol w/ relief per patient pain subsided to 4-5/10.  Lungs remain extremely diminished with pleural rub noted mainly on L side. Continues infrequent weak cough & on 1L O2.  Dr. Roach in to see patient and discussed thoracentesis for tomorrow, and also patient is newly diagnosed w/ diabetes per Dr. Roach.  Appetite improved some with pain relief per patientPatient ambulated around agarwal x 1 this shift.  Remains free of falls, calls appropriately.      Face to face report given with opportunity to observe patient.    Report given to Nirali HERNÁNDEZ RN    9/4/2023  3:30 PM  Sailaja Hutton RN      "

## 2023-09-04 NOTE — PROVIDER NOTIFICATION
Dr Roach notified- Patient wondering if they can get their clomipramine, hydroxyzine, & temazepam ordered so she can take them this evening. Patients family will bring in if our pharmacy does not carry.    Provider ordered meds per PTA med list.     Sailaja Hutton RN on 9/4/2023 at 12:33 PM

## 2023-09-05 ENCOUNTER — APPOINTMENT (OUTPATIENT)
Dept: ULTRASOUND IMAGING | Facility: HOSPITAL | Age: 68
DRG: 193 | End: 2023-09-05
Attending: INTERNAL MEDICINE
Payer: COMMERCIAL

## 2023-09-05 ENCOUNTER — APPOINTMENT (OUTPATIENT)
Dept: GENERAL RADIOLOGY | Facility: HOSPITAL | Age: 68
DRG: 193 | End: 2023-09-05
Attending: RADIOLOGY
Payer: COMMERCIAL

## 2023-09-05 LAB
% MONONUCLEAR CELLS, BODY FLUID: 27 %
ANION GAP SERPL CALCULATED.3IONS-SCNC: 13 MMOL/L (ref 7–15)
APPEARANCE FLD: ABNORMAL
BUN SERPL-MCNC: 13.3 MG/DL (ref 8–23)
CALCIUM SERPL-MCNC: 9 MG/DL (ref 8.8–10.2)
CELL COUNT BODY FLUID SOURCE: ABNORMAL
CHLORIDE SERPL-SCNC: 103 MMOL/L (ref 98–107)
COLOR FLD: YELLOW
CREAT SERPL-MCNC: 0.74 MG/DL (ref 0.51–0.95)
CRP SERPL-MCNC: 432.79 MG/L
DEPRECATED HCO3 PLAS-SCNC: 23 MMOL/L (ref 22–29)
ERYTHROCYTE [DISTWIDTH] IN BLOOD BY AUTOMATED COUNT: 13.2 % (ref 10–15)
GFR SERPL CREATININE-BSD FRML MDRD: 87 ML/MIN/1.73M2
GLUCOSE SERPL-MCNC: 116 MG/DL (ref 70–99)
HCT VFR BLD AUTO: 36.7 % (ref 35–47)
HGB BLD-MCNC: 12 G/DL (ref 11.7–15.7)
LACTATE SERPL-SCNC: 1 MMOL/L (ref 0.7–2)
LDH SERPL L TO P-CCNC: 176 U/L (ref 0–250)
MCH RBC QN AUTO: 29.9 PG (ref 26.5–33)
MCHC RBC AUTO-ENTMCNC: 32.7 G/DL (ref 31.5–36.5)
MCV RBC AUTO: 92 FL (ref 78–100)
NEUTS BAND NFR FLD MANUAL: 73 %
PH BODY FLUID SOURCE: NORMAL
PH FLD: 7 PH
PLATELET # BLD AUTO: 348 10E3/UL (ref 150–450)
POTASSIUM SERPL-SCNC: 3.6 MMOL/L (ref 3.4–5.3)
PROCALCITONIN SERPL IA-MCNC: 0.29 NG/ML
PROT SERPL-MCNC: 6.9 G/DL (ref 6.4–8.3)
RBC # BLD AUTO: 4.01 10E6/UL (ref 3.8–5.2)
RBC # FLD: 2224 /UL
SODIUM SERPL-SCNC: 139 MMOL/L (ref 136–145)
WBC # BLD AUTO: 20.5 10E3/UL (ref 4–11)
WBC # FLD AUTO: 1956 /UL

## 2023-09-05 PROCEDURE — 80048 BASIC METABOLIC PNL TOTAL CA: CPT | Performed by: INTERNAL MEDICINE

## 2023-09-05 PROCEDURE — 250N000009 HC RX 250

## 2023-09-05 PROCEDURE — 85027 COMPLETE CBC AUTOMATED: CPT | Performed by: INTERNAL MEDICINE

## 2023-09-05 PROCEDURE — 250N000011 HC RX IP 250 OP 636: Mod: JZ | Performed by: INTERNAL MEDICINE

## 2023-09-05 PROCEDURE — 250N000009 HC RX 250: Performed by: RADIOLOGY

## 2023-09-05 PROCEDURE — 99232 SBSQ HOSP IP/OBS MODERATE 35: CPT | Performed by: INTERNAL MEDICINE

## 2023-09-05 PROCEDURE — 83615 LACTATE (LD) (LDH) ENZYME: CPT | Performed by: INTERNAL MEDICINE

## 2023-09-05 PROCEDURE — 94640 AIRWAY INHALATION TREATMENT: CPT | Mod: 76

## 2023-09-05 PROCEDURE — 84145 PROCALCITONIN (PCT): CPT | Performed by: INTERNAL MEDICINE

## 2023-09-05 PROCEDURE — 87070 CULTURE OTHR SPECIMN AEROBIC: CPT | Performed by: INTERNAL MEDICINE

## 2023-09-05 PROCEDURE — 250N000013 HC RX MED GY IP 250 OP 250 PS 637: Performed by: INTERNAL MEDICINE

## 2023-09-05 PROCEDURE — 83986 ASSAY PH BODY FLUID NOS: CPT | Performed by: INTERNAL MEDICINE

## 2023-09-05 PROCEDURE — 36415 COLL VENOUS BLD VENIPUNCTURE: CPT | Performed by: INTERNAL MEDICINE

## 2023-09-05 PROCEDURE — 88305 TISSUE EXAM BY PATHOLOGIST: CPT | Mod: 26 | Performed by: PATHOLOGY

## 2023-09-05 PROCEDURE — 84157 ASSAY OF PROTEIN OTHER: CPT | Performed by: INTERNAL MEDICINE

## 2023-09-05 PROCEDURE — 83605 ASSAY OF LACTIC ACID: CPT | Performed by: INTERNAL MEDICINE

## 2023-09-05 PROCEDURE — 88112 CYTOPATH CELL ENHANCE TECH: CPT | Mod: 26 | Performed by: PATHOLOGY

## 2023-09-05 PROCEDURE — 89051 BODY FLUID CELL COUNT: CPT | Performed by: INTERNAL MEDICINE

## 2023-09-05 PROCEDURE — 82945 GLUCOSE OTHER FLUID: CPT | Performed by: INTERNAL MEDICINE

## 2023-09-05 PROCEDURE — 84155 ASSAY OF PROTEIN SERUM: CPT | Performed by: INTERNAL MEDICINE

## 2023-09-05 PROCEDURE — 999N000065 XR CHEST 1 VIEW

## 2023-09-05 PROCEDURE — 88305 TISSUE EXAM BY PATHOLOGIST: CPT | Mod: TC | Performed by: INTERNAL MEDICINE

## 2023-09-05 PROCEDURE — 94640 AIRWAY INHALATION TREATMENT: CPT

## 2023-09-05 PROCEDURE — 999N000157 HC STATISTIC RCP TIME EA 10 MIN

## 2023-09-05 PROCEDURE — 86140 C-REACTIVE PROTEIN: CPT | Performed by: INTERNAL MEDICINE

## 2023-09-05 PROCEDURE — 250N000009 HC RX 250: Performed by: INTERNAL MEDICINE

## 2023-09-05 PROCEDURE — 258N000003 HC RX IP 258 OP 636: Performed by: INTERNAL MEDICINE

## 2023-09-05 PROCEDURE — 32555 ASPIRATE PLEURA W/ IMAGING: CPT

## 2023-09-05 PROCEDURE — 84478 ASSAY OF TRIGLYCERIDES: CPT | Performed by: INTERNAL MEDICINE

## 2023-09-05 PROCEDURE — 120N000001 HC R&B MED SURG/OB

## 2023-09-05 RX ORDER — LIDOCAINE HYDROCHLORIDE 10 MG/ML
10-20 INJECTION, SOLUTION EPIDURAL; INFILTRATION; INTRACAUDAL; PERINEURAL ONCE
Status: COMPLETED | OUTPATIENT
Start: 2023-09-05 | End: 2023-09-05

## 2023-09-05 RX ORDER — NICOTINE POLACRILEX 4 MG
15-30 LOZENGE BUCCAL
Status: DISCONTINUED | OUTPATIENT
Start: 2023-09-05 | End: 2023-09-07 | Stop reason: HOSPADM

## 2023-09-05 RX ORDER — LIDOCAINE HYDROCHLORIDE 10 MG/ML
INJECTION, SOLUTION EPIDURAL; INFILTRATION; INTRACAUDAL; PERINEURAL
Status: COMPLETED
Start: 2023-09-05 | End: 2023-09-05

## 2023-09-05 RX ORDER — CEFEPIME HYDROCHLORIDE 2 G/1
2 INJECTION, POWDER, FOR SOLUTION INTRAVENOUS EVERY 8 HOURS
Status: DISCONTINUED | OUTPATIENT
Start: 2023-09-06 | End: 2023-09-07 | Stop reason: HOSPADM

## 2023-09-05 RX ORDER — DEXTROSE MONOHYDRATE 25 G/50ML
25-50 INJECTION, SOLUTION INTRAVENOUS
Status: DISCONTINUED | OUTPATIENT
Start: 2023-09-05 | End: 2023-09-07 | Stop reason: HOSPADM

## 2023-09-05 RX ORDER — NICOTINE POLACRILEX 4 MG
15-30 LOZENGE BUCCAL
Status: DISCONTINUED | OUTPATIENT
Start: 2023-09-05 | End: 2023-09-05 | Stop reason: HOSPADM

## 2023-09-05 RX ORDER — LIDOCAINE 40 MG/G
CREAM TOPICAL
Status: DISCONTINUED | OUTPATIENT
Start: 2023-09-05 | End: 2023-09-05 | Stop reason: HOSPADM

## 2023-09-05 RX ORDER — DEXTROSE MONOHYDRATE 25 G/50ML
25-50 INJECTION, SOLUTION INTRAVENOUS
Status: DISCONTINUED | OUTPATIENT
Start: 2023-09-05 | End: 2023-09-05 | Stop reason: HOSPADM

## 2023-09-05 RX ADMIN — IPRATROPIUM BROMIDE AND ALBUTEROL SULFATE 3 ML: .5; 3 SOLUTION RESPIRATORY (INHALATION) at 07:06

## 2023-09-05 RX ADMIN — PANTOPRAZOLE SODIUM 40 MG: 40 TABLET, DELAYED RELEASE ORAL at 20:40

## 2023-09-05 RX ADMIN — HYDROXYZINE HYDROCHLORIDE 50 MG: 25 TABLET, FILM COATED ORAL at 19:33

## 2023-09-05 RX ADMIN — VENLAFAXINE HYDROCHLORIDE 300 MG: 150 CAPSULE, EXTENDED RELEASE ORAL at 09:09

## 2023-09-05 RX ADMIN — LEVOTHYROXINE SODIUM 50 MCG: 0.05 TABLET ORAL at 06:36

## 2023-09-05 RX ADMIN — LIDOCAINE HYDROCHLORIDE 5 ML: 10 INJECTION, SOLUTION EPIDURAL; INFILTRATION; INTRACAUDAL; PERINEURAL at 13:41

## 2023-09-05 RX ADMIN — IPRATROPIUM BROMIDE AND ALBUTEROL SULFATE 3 ML: .5; 3 SOLUTION RESPIRATORY (INHALATION) at 19:45

## 2023-09-05 RX ADMIN — KETOROLAC TROMETHAMINE 15 MG: 15 INJECTION, SOLUTION INTRAMUSCULAR; INTRAVENOUS at 20:40

## 2023-09-05 RX ADMIN — IPRATROPIUM BROMIDE AND ALBUTEROL SULFATE 3 ML: .5; 3 SOLUTION RESPIRATORY (INHALATION) at 11:03

## 2023-09-05 RX ADMIN — VANCOMYCIN HYDROCHLORIDE 2000 MG: 5 INJECTION, POWDER, LYOPHILIZED, FOR SOLUTION INTRAVENOUS at 10:18

## 2023-09-05 RX ADMIN — CEFEPIME 2 G: 2 INJECTION, POWDER, FOR SOLUTION INTRAVENOUS at 01:48

## 2023-09-05 RX ADMIN — ACETAMINOPHEN 650 MG: 325 TABLET, FILM COATED ORAL at 22:20

## 2023-09-05 RX ADMIN — PROPRANOLOL HYDROCHLORIDE 80 MG: 80 CAPSULE, EXTENDED RELEASE ORAL at 09:09

## 2023-09-05 RX ADMIN — PRAVASTATIN SODIUM 10 MG: 10 TABLET ORAL at 09:09

## 2023-09-05 RX ADMIN — LIDOCAINE HYDROCHLORIDE 3 ML: 10 INJECTION, SOLUTION EPIDURAL; INFILTRATION; INTRACAUDAL; PERINEURAL at 13:42

## 2023-09-05 RX ADMIN — CEFEPIME 2 G: 2 INJECTION, POWDER, FOR SOLUTION INTRAVENOUS at 19:33

## 2023-09-05 RX ADMIN — AZITHROMYCIN MONOHYDRATE 500 MG: 500 INJECTION, POWDER, LYOPHILIZED, FOR SOLUTION INTRAVENOUS at 20:47

## 2023-09-05 RX ADMIN — IPRATROPIUM BROMIDE AND ALBUTEROL SULFATE 3 ML: .5; 3 SOLUTION RESPIRATORY (INHALATION) at 15:02

## 2023-09-05 RX ADMIN — HYDROMORPHONE HYDROCHLORIDE 0.5 MG: 1 INJECTION, SOLUTION INTRAMUSCULAR; INTRAVENOUS; SUBCUTANEOUS at 22:20

## 2023-09-05 RX ADMIN — PANTOPRAZOLE SODIUM 40 MG: 40 TABLET, DELAYED RELEASE ORAL at 09:10

## 2023-09-05 RX ADMIN — TEMAZEPAM 30 MG: 15 CAPSULE ORAL at 22:20

## 2023-09-05 RX ADMIN — LIDOCAINE 2 PATCH: 4 PATCH TOPICAL at 20:51

## 2023-09-05 RX ADMIN — CLOMIPRAMINE HYDROCHLORIDE 50 MG: 25 CAPSULE ORAL at 22:20

## 2023-09-05 RX ADMIN — CEFEPIME 2 G: 2 INJECTION, POWDER, FOR SOLUTION INTRAVENOUS at 09:08

## 2023-09-05 RX ADMIN — KETOROLAC TROMETHAMINE 15 MG: 15 INJECTION, SOLUTION INTRAMUSCULAR; INTRAVENOUS at 09:11

## 2023-09-05 RX ADMIN — HYDROMORPHONE HYDROCHLORIDE 0.5 MG: 1 INJECTION, SOLUTION INTRAMUSCULAR; INTRAVENOUS; SUBCUTANEOUS at 13:05

## 2023-09-05 ASSESSMENT — ACTIVITIES OF DAILY LIVING (ADL)
ADLS_ACUITY_SCORE: 30
ADLS_ACUITY_SCORE: 28
ADLS_ACUITY_SCORE: 28
ADLS_ACUITY_SCORE: 30
ADLS_ACUITY_SCORE: 28
ADLS_ACUITY_SCORE: 30
ADLS_ACUITY_SCORE: 28
ADLS_ACUITY_SCORE: 30

## 2023-09-05 NOTE — PLAN OF CARE
Free from falls/injuries this shift. Assess as charted. Med with IV Toradol X2 this shift and IV Dilaudid X2 this shift. Feels pain is much better controlled with these 2 meds. Temp max 100.2 this shift. Sepsis flag-Lactic : 1.7. Up in chair for dinner. Did amb to bathrom this evening with SBA. Does well. Continues in IV Azithromycin, Maxipime, & Vanco.  Took PRN Restoril & hydroxyzine to aid in sleep. Refused Lidocaine patch and Nicotine Patch. Pt understands plan for Thoracentesis in am.     Face to face report given with opportunity to observe patient.    Report given to Elaine Garcia RN   9/4/2023  11:52 PM

## 2023-09-05 NOTE — PLAN OF CARE
Pt is A&O x4. VS as charted, afebrile, no c/o pain this shift. On 2L NC.  IV saline locked. SBA for transfers. Able to make needs know, call light in reach.    Face to face report given with opportunity to observe patient.    Report given to SEAN Menard RN   9/5/2023  7:24 AM

## 2023-09-05 NOTE — MEDICATION SCRIBE - ADMISSION MEDICATION HISTORY
Medication Scribe Admission Medication History    Admission medication history is complete. The information provided in this note is only as accurate as the sources available at the time of the update.    Medication reconciliation/reorder completed by provider prior to medication history? Yes    Information Source(s): Patient and CareEverywhere/SureScripts via phone    Pertinent Information:   Patient manages her own medications and is a good historian.   Lasix- takes in the afternoon.   Restoril- takes scheduled.     Changes made to PTA medication list:  Added: None  Deleted: None  Changed: updated calcium supplement. Updated atarax. Anafranil from 50 mg to 100 mg (spouse checked bottle at home as precaution as well)    Medication Affordability:  Not including over the counter (OTC) medications, was there a time in the past 3 months when you did not take your medications as prescribed because of cost?: No    Allergies reviewed with patient and updates made in EHR: yes    Medication History Completed By: Ynes Anglin 9/5/2023 1:27 PM    Prior to Admission medications    Medication Sig Last Dose Taking? Auth Provider Long Term End Date   Calcium Citrate (CITRACAL OR) Take 1,200 mg by mouth At Bedtime (With magnesium and vitamin d3) 9/2/2023 at HS Yes Reported, Patient     clomiPRAMINE (ANAFRANIL) 50 MG capsule Take 100 mg by mouth At Bedtime 9/2/2023 at HS Yes Reported, Patient Yes    cyanocobalamin (VITAMIN B-12) 1000 MCG tablet Take 1,000 mcg by mouth every other day -morning 9/2/2023 at AM Yes Reported, Patient     furosemide (LASIX) 20 MG tablet Take 1 tablet by mouth daily at 2 pm 9/2/2023 at 1400 Yes Reported, Patient Yes    hydrOXYzine (ATARAX) 25 MG tablet Take 50 mg by mouth 4 times daily as needed for anxiety 9/2/2023 Yes Reported, Patient     Krill Oil 300 MG CAPS Take 300 mg by mouth every morning 9/3/2023 at AM Yes Unknown, Entered By History     levothyroxine (SYNTHROID/LEVOTHROID) 50 MCG tablet Take 1  tablet by mouth daily before breakfast 9/3/2023 at 0730 Yes Reported, Patient Yes    omeprazole (PRILOSEC) 40 MG DR capsule Take 40 mg by mouth every morning 9/3/2023 at 1000 Yes Reported, Patient     pravastatin (PRAVACHOL) 10 MG tablet Take 1 tablet by mouth every morning 9/3/2023 at 1000 Yes Reported, Patient Yes    propranolol ER (INDERAL LA) 80 MG 24 hr capsule Take 80 mg by mouth every morning 9/3/2023 at 1000 Yes Reported, Patient Yes    temazepam (RESTORIL) 30 MG capsule Take 30 mg by mouth nightly as needed for sleep 9/2/2023 at HS Yes Reported, Patient     venlafaxine (EFFEXOR XR) 150 MG 24 hr capsule Take 2 capsules by mouth every morning (before breakfast) 9/3/2023 at 0730 Yes Reported, Patient Yes

## 2023-09-05 NOTE — PROGRESS NOTES
Pt. Down to IR for thoracentesis.      Face to face report given with opportunity to observe patient.    Report given to Concepcion Hutton RN   9/5/2023  1:13 PM

## 2023-09-05 NOTE — PROGRESS NOTES
[facility]    Hospitalist Progress Note    Date of Service (when I saw the patient): 09/05/2023    Assessment & Plan   Tianna Lobato is a 68 year old female who was admitted on 9/3/2023.     Community-acquired pneumonia: Resultant sepsis and acute hypoxic respiratory failure.  CTA chest with right-sided opacities, left-sided opacities as well as moderate lleft pleural effusion.  COVID-negative.  Patient is a smoker, but no formal diagnosis of COPD has ever been made.  Tmax 100.7 on admission, WBC 12.8.  Blood cultures drawn in the emergency department. Legionella, strep pneumo antigen pending.  -9/4: With persistent symptoms and worsening inflammatory markers, will switch ceftriaxone to cefepime, add vancomycin.  Continue azithromycin.  Blood cultures no growth to date.  Will order ultrasound-guided thoracentesis with studies on left effusion  -9/5: Patient reports mild improvement.  Inflammatory markers still elevated if not worsening.  Continuing cefepime, azithromycin, vancomycin.  Respiratory status stable, weaned down to 1 L nasal cannula.  Patient did undergo ultrasound-guided thoracentesis on left-sided effusion today.  Legionella, strep pneumo antigen negative.    Essential hypertension: Patient is on Inderal LA 80 mg.  -Continue as able     Bilateral lower leg swelling: Patient is on Lasix 20 mg daily  -We will hold currently in the setting of acute infection     Anxiety/depression: Continue home Effexor, clonazepam     Hyperlipidemia: Continue home statin     Hypothyroidism: Continue home Synthroid     Tobacco dependence: Nicotine replacement as needed     FEN: Oral diet as tolerated.  Electrolytes within normal limits.    Clinically Significant Risk Factors                        # DMII: A1C = 7.4 % (Ref range: <5.7 %) within past 6 months  , PRESENT ON ADMISSION    # Obesity: Estimated body mass index is 37.66 kg/m  as calculated from the following:    Height as of this encounter: 1.549 m  "(5' 1\").    Weight as of this encounter: 90.4 kg (199 lb 4.7 oz)., PRESENT ON ADMISSION            DVT Prophylaxis: Enoxaparin (Lovenox) subcutaneous, holding for thoracentesis    Code Status: Full Code    Disposition: Expected discharge in 2-3 days once clinically improved.    Kamini Roach MD, MD        Interval History   Patient seen in room.  Patient sitting at edge of bed, states that pleuritic pain still persistent.  Tmax 101.  Mild clinical improvement.    -Data reviewed today: I reviewed all new labs and imaging results over the last 24 hours. I personally reviewed imaging reports.    Physical Exam   Temp: (!) 101  F (38.3  C) (Simultaneous filing. User may not have seen previous data.) Temp src: Tympanic (Simultaneous filing. User may not have seen previous data.) BP: 130/55 Pulse: 92   Resp: 16 SpO2: 94 % O2 Device: Nasal cannula Oxygen Delivery: 1 LPM  Vitals:    09/03/23 1729 09/04/23 0315 09/05/23 0313   Weight: 88.4 kg (194 lb 14.2 oz) 87.7 kg (193 lb 5.5 oz) 90.4 kg (199 lb 4.7 oz)     Vital Signs with Ranges  Temp:  [98.1  F (36.7  C)-101  F (38.3  C)] 101  F (38.3  C)  Pulse:  [62-92] 92  Resp:  [16-18] 16  BP: (103-130)/(55-69) 130/55  SpO2:  [89 %-100 %] 94 %      Intake/Output Summary (Last 24 hours) at 9/5/2023 0803  Last data filed at 9/4/2023 1616  Gross per 24 hour   Intake 1044 ml   Output 200 ml   Net 844 ml         Peripheral IV 09/04/23 Anterior;Right Lower forearm (Active)   Site Assessment WDL 09/05/23 0201   Line Status Saline locked 09/05/23 0201   Dressing Transparent 09/05/23 0201   Dressing Status clean;dry;intact 09/05/23 0201   Dressing Intervention New dressing  09/04/23 1115   Line Intervention Flushed 09/05/23 0201   Phlebitis Scale 0-->no symptoms 09/05/23 0201   Number of days: 1     No line/device    Constitutional: AA, NAD, obese  Eyes: PERRLA, no injection, no icterus  HEENT: atraumatic, normocephalic  Respiratory: reduced breath sounds b/l  Cardiovascular: S1 S2 " RRR  GI: soft, NT, ND, + bowel sounds  Lymph/Hematologic: no palpable lymphadenopathy  Skin: no rashes, no lesions  Musculoskeletal: No LE edema, good tone, no deformities  Neurologic: oriented x 3, no focal deficits  Psychiatric: appropriate affect         Medications    sodium chloride Stopped (09/03/23 1711)      azithromycin  500 mg Intravenous Q24H    ceFEPIme  2 g Intravenous Q8H    clomiPRAMINE  50 mg Oral At Bedtime    [Held by provider] enoxaparin ANTICOAGULANT  40 mg Subcutaneous Q24H    [Held by provider] furosemide  20 mg Oral Daily    ipratropium - albuterol 0.5 mg/2.5 mg/3 mL  3 mL Nebulization 4x daily    levothyroxine  50 mcg Oral QAM AC    lidocaine  2 patch Transdermal Q24h    nicotine   Transdermal Q8H    pantoprazole  40 mg Oral BID    pravastatin  10 mg Oral Daily    propranolol ER  80 mg Oral Daily    sodium chloride (PF)  3 mL Intracatheter Q8H    vancomycin  2,000 mg Intravenous Q24H    venlafaxine  300 mg Oral Daily       Data   Recent Labs   Lab 09/05/23  0539 09/04/23  0524 09/03/23  1407   WBC 20.5* 19.1* 12.8*   HGB 12.0 12.2 13.3   MCV 92 91 90    376 329   INR  --   --  0.99    139 138   POTASSIUM 3.6 3.6 4.1   CHLORIDE 103 101 100   CO2 23 25 25   BUN 13.3 8.5 9.3   CR 0.74 0.77 0.88   ANIONGAP 13 13 13   MESSI 9.0 8.8 9.4   * 135* 214*   ALBUMIN  --   --  3.5   PROTTOTAL 6.9  --  7.1   BILITOTAL  --   --  0.2   ALKPHOS  --   --  101   ALT  --   --  21   AST  --   --  26            No results found for this or any previous visit (from the past 24 hour(s)).    Kamini Roach MD

## 2023-09-05 NOTE — PROGRESS NOTES
Care Transitions focused note:      Checked in with Tianna.  She lives at home with her , Josiah.  Independent and active.  Connected with [facility] for primary care and mental health needs.  No concerns noted.  CTS will remain available for support and resources as appropriate.

## 2023-09-05 NOTE — PROGRESS NOTES
Procedure: US Thoracentesis, left    There were  no complications and patient has no symptoms..      Tolerated procedure well.    Patient to Procedure Room.  Sitting for procedure.  left lung draining clear yellow fluid.  Total fluid 45 ml.  Fluid Will be sent to lab.  Patient to X-ray for post procedure films.      Discharge to home once radiologist has reviewed the films and instructions given.    Radiologist:Dr Oconnor    Time Out: Prior to the start of the procedure and with procedural staff participation, I verbally confirmed the patient s identity using two indicators, relevant allergies, that the procedure was appropriate and matched the consent or emergent situation, and that the correct equipment/implants were available. Immediately prior to starting the procedure I conducted the Time Out with the procedural staff and re-confirmed the patient s name, procedure, and site/side. (The Joint Commission universal protocol was followed.)  Yes    Position:   sitting up    Pain:  3    Sedation:None. Local Anesthestic used  No sedation    Estimated Blood Loss: Minimal     Condition: Stable    Comments: See dictated procedure note for full details     Concepcion South RN

## 2023-09-05 NOTE — PHARMACY-MEDICATION REGIMEN REVIEW
Pharmacy Antimicrobial Stewardship Assessment     Current Antimicrobial Therapy:  Anti-infectives (From now, onward)      Start     Dose/Rate Route Frequency Ordered Stop    23 1730  ceFEPIme (MAXIPIME) 2 g vial to attach to  mL bag for ADULTS or 50 mL bag for PEDS         2 g  over 30 Minutes Intravenous EVERY 8 HOURS 23 1032      23 0900  vancomycin (VANCOCIN) 2,000 mg in 0.9% NaCl 500 mL intermittent infusion         2,000 mg  over 2 Hours Intravenous EVERY 24 HOURS 23 0819      23 1730  azithromycin (ZITHROMAX) 500 mg in sodium chloride 0.9 % 250 mL intermittent infusion         500 mg  over 1 Hours Intravenous EVERY 24 HOURS 23 1718            Indication: CAP/Sepsis (cefepime+vancomycin), CAP (azithromycin)    Days of Therapy:   Cefepime: day 2  Vancomycin: day 2  Azithromycin: day 3     Pertinent Labs:  Recent Labs   Lab Test 23  0539 23  0524 23  1407   WBC 20.5* 19.1* 12.8*     Recent Labs   Lab Test 23  0539 23  1631 23  0524   LACT  --  1.7  --    PCAL 0.29*  --  0.36*        Temperature:  Temp (24hrs), Av.3  F (37.4  C), Min:98.1  F (36.7  C), Max:100.2  F (37.9  C)    Culture Results:     9/3/23: Mycoplasma Pneumoniae by PCR: needs to be collected still  23: Aerobic Bacterial Culture routine: needs to be collected still    Recommendations/Interventions:  Cefepime is a restricted antibiotic. Recommend switching it to Zosyn.       Sapphire Handley, [facility]  2023

## 2023-09-06 LAB
ANION GAP SERPL CALCULATED.3IONS-SCNC: 15 MMOL/L (ref 7–15)
BUN SERPL-MCNC: 14.6 MG/DL (ref 8–23)
CALCIUM SERPL-MCNC: 9 MG/DL (ref 8.8–10.2)
CHLORIDE SERPL-SCNC: 104 MMOL/L (ref 98–107)
CREAT SERPL-MCNC: 0.68 MG/DL (ref 0.51–0.95)
CRP SERPL-MCNC: 403.17 MG/L
DEPRECATED HCO3 PLAS-SCNC: 21 MMOL/L (ref 22–29)
ERYTHROCYTE [DISTWIDTH] IN BLOOD BY AUTOMATED COUNT: 13 % (ref 10–15)
GFR SERPL CREATININE-BSD FRML MDRD: >90 ML/MIN/1.73M2
GLUCOSE BODY FLUID SOURCE: NORMAL
GLUCOSE FLD-MCNC: 65 MG/DL
GLUCOSE SERPL-MCNC: 120 MG/DL (ref 70–99)
HCT VFR BLD AUTO: 38.4 % (ref 35–47)
HGB BLD-MCNC: 12.8 G/DL (ref 11.7–15.7)
LD BODY BODY FLUID SOURCE: NORMAL
LDH FLD L TO P-CCNC: 1503 U/L
MCH RBC QN AUTO: 29.7 PG (ref 26.5–33)
MCHC RBC AUTO-ENTMCNC: 33.3 G/DL (ref 31.5–36.5)
MCV RBC AUTO: 89 FL (ref 78–100)
PLATELET # BLD AUTO: 352 10E3/UL (ref 150–450)
POTASSIUM SERPL-SCNC: 4.1 MMOL/L (ref 3.4–5.3)
PROCALCITONIN SERPL IA-MCNC: 0.25 NG/ML
PROT FLD-MCNC: 4.2 G/DL
PROTEIN BODY FLUID SOURCE: NORMAL
RBC # BLD AUTO: 4.31 10E6/UL (ref 3.8–5.2)
SODIUM SERPL-SCNC: 140 MMOL/L (ref 136–145)
TRIGL FLD-MCNC: 34 MG/DL
TRIGLYCERIDE BODY FLUID SOURCE: NORMAL
WBC # BLD AUTO: 17 10E3/UL (ref 4–11)

## 2023-09-06 PROCEDURE — 80048 BASIC METABOLIC PNL TOTAL CA: CPT | Performed by: INTERNAL MEDICINE

## 2023-09-06 PROCEDURE — 258N000003 HC RX IP 258 OP 636: Performed by: INTERNAL MEDICINE

## 2023-09-06 PROCEDURE — 94640 AIRWAY INHALATION TREATMENT: CPT

## 2023-09-06 PROCEDURE — 86140 C-REACTIVE PROTEIN: CPT | Performed by: INTERNAL MEDICINE

## 2023-09-06 PROCEDURE — 250N000009 HC RX 250: Performed by: INTERNAL MEDICINE

## 2023-09-06 PROCEDURE — 120N000001 HC R&B MED SURG/OB

## 2023-09-06 PROCEDURE — 94640 AIRWAY INHALATION TREATMENT: CPT | Mod: 76

## 2023-09-06 PROCEDURE — 250N000011 HC RX IP 250 OP 636: Mod: JZ | Performed by: INTERNAL MEDICINE

## 2023-09-06 PROCEDURE — 36415 COLL VENOUS BLD VENIPUNCTURE: CPT | Performed by: INTERNAL MEDICINE

## 2023-09-06 PROCEDURE — 250N000013 HC RX MED GY IP 250 OP 250 PS 637: Performed by: INTERNAL MEDICINE

## 2023-09-06 PROCEDURE — 999N000157 HC STATISTIC RCP TIME EA 10 MIN

## 2023-09-06 PROCEDURE — 85027 COMPLETE CBC AUTOMATED: CPT | Performed by: INTERNAL MEDICINE

## 2023-09-06 PROCEDURE — 99232 SBSQ HOSP IP/OBS MODERATE 35: CPT | Performed by: INTERNAL MEDICINE

## 2023-09-06 PROCEDURE — 84145 PROCALCITONIN (PCT): CPT | Performed by: INTERNAL MEDICINE

## 2023-09-06 RX ORDER — OXYCODONE HYDROCHLORIDE 5 MG/1
5 TABLET ORAL EVERY 6 HOURS PRN
Status: DISCONTINUED | OUTPATIENT
Start: 2023-09-06 | End: 2023-09-07 | Stop reason: HOSPADM

## 2023-09-06 RX ORDER — CLOMIPRAMINE HYDROCHLORIDE 25 MG/1
100 CAPSULE ORAL AT BEDTIME
Status: DISCONTINUED | OUTPATIENT
Start: 2023-09-06 | End: 2023-09-07 | Stop reason: HOSPADM

## 2023-09-06 RX ADMIN — PRAVASTATIN SODIUM 10 MG: 10 TABLET ORAL at 09:36

## 2023-09-06 RX ADMIN — KETOROLAC TROMETHAMINE 15 MG: 15 INJECTION, SOLUTION INTRAMUSCULAR; INTRAVENOUS at 22:02

## 2023-09-06 RX ADMIN — VANCOMYCIN HYDROCHLORIDE 2000 MG: 5 INJECTION, POWDER, LYOPHILIZED, FOR SOLUTION INTRAVENOUS at 09:55

## 2023-09-06 RX ADMIN — TEMAZEPAM 30 MG: 15 CAPSULE ORAL at 20:15

## 2023-09-06 RX ADMIN — IPRATROPIUM BROMIDE AND ALBUTEROL SULFATE 3 ML: .5; 3 SOLUTION RESPIRATORY (INHALATION) at 07:49

## 2023-09-06 RX ADMIN — OXYCODONE HYDROCHLORIDE 5 MG: 5 TABLET ORAL at 20:15

## 2023-09-06 RX ADMIN — IPRATROPIUM BROMIDE AND ALBUTEROL SULFATE 3 ML: .5; 3 SOLUTION RESPIRATORY (INHALATION) at 11:22

## 2023-09-06 RX ADMIN — VENLAFAXINE HYDROCHLORIDE 300 MG: 150 CAPSULE, EXTENDED RELEASE ORAL at 09:34

## 2023-09-06 RX ADMIN — KETOROLAC TROMETHAMINE 15 MG: 15 INJECTION, SOLUTION INTRAMUSCULAR; INTRAVENOUS at 12:50

## 2023-09-06 RX ADMIN — PANTOPRAZOLE SODIUM 40 MG: 40 TABLET, DELAYED RELEASE ORAL at 09:33

## 2023-09-06 RX ADMIN — LEVOTHYROXINE SODIUM 50 MCG: 0.05 TABLET ORAL at 08:18

## 2023-09-06 RX ADMIN — CLOMIPRAMINE HYDROCHLORIDE 100 MG: 25 CAPSULE ORAL at 22:03

## 2023-09-06 RX ADMIN — IPRATROPIUM BROMIDE AND ALBUTEROL SULFATE 3 ML: .5; 3 SOLUTION RESPIRATORY (INHALATION) at 15:24

## 2023-09-06 RX ADMIN — CEFEPIME 2 G: 2 INJECTION, POWDER, FOR SOLUTION INTRAVENOUS at 22:03

## 2023-09-06 RX ADMIN — ACETAMINOPHEN 650 MG: 325 TABLET, FILM COATED ORAL at 12:50

## 2023-09-06 RX ADMIN — ACETAMINOPHEN 650 MG: 325 TABLET, FILM COATED ORAL at 20:15

## 2023-09-06 RX ADMIN — CEFEPIME 2 G: 2 INJECTION, POWDER, FOR SOLUTION INTRAVENOUS at 04:27

## 2023-09-06 RX ADMIN — HYDROMORPHONE HYDROCHLORIDE 0.5 MG: 1 INJECTION, SOLUTION INTRAMUSCULAR; INTRAVENOUS; SUBCUTANEOUS at 22:02

## 2023-09-06 RX ADMIN — IPRATROPIUM BROMIDE AND ALBUTEROL SULFATE 3 ML: .5; 3 SOLUTION RESPIRATORY (INHALATION) at 19:39

## 2023-09-06 RX ADMIN — PROPRANOLOL HYDROCHLORIDE 80 MG: 80 CAPSULE, EXTENDED RELEASE ORAL at 09:35

## 2023-09-06 RX ADMIN — PANTOPRAZOLE SODIUM 40 MG: 40 TABLET, DELAYED RELEASE ORAL at 20:15

## 2023-09-06 RX ADMIN — AZITHROMYCIN MONOHYDRATE 500 MG: 500 INJECTION, POWDER, LYOPHILIZED, FOR SOLUTION INTRAVENOUS at 22:03

## 2023-09-06 RX ADMIN — HYDROMORPHONE HYDROCHLORIDE 0.5 MG: 1 INJECTION, SOLUTION INTRAMUSCULAR; INTRAVENOUS; SUBCUTANEOUS at 12:50

## 2023-09-06 RX ADMIN — HYDROXYZINE HYDROCHLORIDE 50 MG: 25 TABLET, FILM COATED ORAL at 20:15

## 2023-09-06 RX ADMIN — LIDOCAINE 2 PATCH: 4 PATCH TOPICAL at 22:27

## 2023-09-06 RX ADMIN — ACETAMINOPHEN 650 MG: 325 TABLET, FILM COATED ORAL at 04:12

## 2023-09-06 RX ADMIN — CEFEPIME 2 G: 2 INJECTION, POWDER, FOR SOLUTION INTRAVENOUS at 13:59

## 2023-09-06 RX ADMIN — ALBUTEROL SULFATE 2.5 MG: 2.5 SOLUTION RESPIRATORY (INHALATION) at 03:57

## 2023-09-06 ASSESSMENT — ACTIVITIES OF DAILY LIVING (ADL)
ADLS_ACUITY_SCORE: 32
ADLS_ACUITY_SCORE: 30
ADLS_ACUITY_SCORE: 32
ADLS_ACUITY_SCORE: 28
ADLS_ACUITY_SCORE: 32
ADLS_ACUITY_SCORE: 30
ADLS_ACUITY_SCORE: 32
ADLS_ACUITY_SCORE: 30

## 2023-09-06 NOTE — CARE PLAN
"Most recent vitals: /66   Pulse 76   Temp 99.2  F (37.3  C) (Tympanic)   Resp 18   Ht 1.549 m (5' 1\")   Wt 90.4 kg (199 lb 4.7 oz)   SpO2 94%   BMI 37.66 kg/m      Comments:     A/O, calm/cooperative (anxious at end of shift).  VSS, afebrile, throacentesis done (47 mL out).  SL Left forearm.  R. Forearm IV infiltrated with vancomycin. Redness subsided, edema decreased, patient denies tenderness.  Remains on 1L O2 sating mid 90's.  Calls appropriately, SBA, up in chair.     Face to face report given with opportunity to observe patient.    Report given to Kari SELBY RN    9/5/2023  8:12 PM  Sailaja Hutton RN    "

## 2023-09-06 NOTE — PROGRESS NOTES
[facility]    Hospitalist Progress Note    Date of Service (when I saw the patient): 09/06/2023    Assessment & Plan   Tianna Lobato is a 68 year old female who was admitted on 9/3/2023.     Community-acquired pneumonia: Resultant sepsis and acute hypoxic respiratory failure.  CTA chest with right-sided opacities, left-sided opacities as well as moderate lleft pleural effusion.  COVID-negative.  Patient is a smoker, but no formal diagnosis of COPD has ever been made.  Tmax 100.7 on admission, WBC 12.8.  Blood cultures drawn in the emergency department. Legionella, strep pneumo antigen pending.  -9/4: With persistent symptoms and worsening inflammatory markers, will switch ceftriaxone to cefepime, add vancomycin.  Continue azithromycin.  Blood cultures no growth to date.  Will order ultrasound-guided thoracentesis with studies on left effusion  -9/5: Patient reports mild improvement.  Inflammatory markers still elevated if not worsening.  Continuing cefepime, azithromycin, vancomycin.  Respiratory status stable, weaned down to 1 L nasal cannula.  Patient did undergo ultrasound-guided thoracentesis on left-sided effusion today.  Legionella, strep pneumo antigen negative.  -9/6: Patient's respiratory status stable, continues on 1 L.  Inflammatory markers remain elevated.  Continue on cefepime, azithromycin, vancomycin.  Pleural fluid analysis pending including culture, but preliminarily pH 7.0, suggesting infection.  May need to consult surgery for chest tube.    Essential hypertension: Patient is on Inderal LA 80 mg.  -Continue as able    Diabetes mellitus: Newly diagnosed on admission, hemoglobin A1c 7.4.  -We will order insulin sliding scale as needed  -Likely discharge on metformin     Bilateral lower leg swelling: Patient is on Lasix 20 mg daily  -We will hold currently in the setting of acute infection     Anxiety/depression: Continue home Effexor, clonazepam     Hyperlipidemia: Continue home  "statin     Hypothyroidism: Continue home Synthroid     Tobacco dependence: Nicotine replacement as needed     FEN: Oral diet as tolerated.  Electrolytes within normal limits.    Clinically Significant Risk Factors                        # DMII: A1C = 7.4 % (Ref range: <5.7 %) within past 6 months  , PRESENT ON ADMISSION    # Obesity: Estimated body mass index is 37.74 kg/m  as calculated from the following:    Height as of this encounter: 1.549 m (5' 1\").    Weight as of this encounter: 90.6 kg (199 lb 11.8 oz)., PRESENT ON ADMISSION            DVT Prophylaxis: Enoxaparin (Lovenox) subcutaneous, holding for thoracentesis    Code Status: Full Code    Disposition: Expected discharge in 2-3 days once clinically improved.    Kamini Roach MD, MD        Interval History   Patient seen in room.  Tmax 101 overnight, patient reports subjective chills.  Respiratory status is stable.    -Data reviewed today: I reviewed all new labs and imaging results over the last 24 hours. I personally reviewed imaging reports.    Physical Exam   Temp: 97.7  F (36.5  C) Temp src: Tympanic BP: 131/53 Pulse: 75   Resp: 20 SpO2: 92 % O2 Device: Nasal cannula Oxygen Delivery: 1.5 LPM  Vitals:    09/04/23 0315 09/05/23 0313 09/06/23 0531   Weight: 87.7 kg (193 lb 5.5 oz) 90.4 kg (199 lb 4.7 oz) 90.6 kg (199 lb 11.8 oz)     Vital Signs with Ranges  Temp:  [97.5  F (36.4  C)-100.9  F (38.3  C)] 97.7  F (36.5  C)  Pulse:  [71-82] 75  Resp:  [16-24] 20  BP: (109-131)/(53-69) 131/53  SpO2:  [88 %-95 %] 92 %      Intake/Output Summary (Last 24 hours) at 9/6/2023 0916  Last data filed at 9/6/2023 0532  Gross per 24 hour   Intake 240 ml   Output 500 ml   Net -260 ml         Peripheral IV 09/06/23 Anterior;Right Upper forearm (Active)   Site Assessment WDL 09/06/23 0427   Line Status Infusing 09/06/23 0427   Dressing Transparent 09/06/23 0427   Dressing Status clean;dry;intact 09/06/23 0427   Dressing Intervention New dressing  09/06/23 0427   Line " Intervention Flushed 09/06/23 0427   Phlebitis Scale 0-->no symptoms 09/06/23 0427   Infiltration? no 09/06/23 0427   Number of days: 0     No line/device    Constitutional: AA, NAD, obese  Eyes: PERRLA, no injection, no icterus  HEENT: atraumatic, normocephalic  Respiratory: reduced breath sounds b/l  Cardiovascular: S1 S2 RRR  GI: soft, NT, ND, + bowel sounds  Lymph/Hematologic: no palpable lymphadenopathy  Skin: no rashes, no lesions  Musculoskeletal: No LE edema, good tone, no deformities  Neurologic: oriented x 3, no focal deficits  Psychiatric: appropriate affect         Medications    sodium chloride Stopped (09/03/23 1711)      azithromycin  500 mg Intravenous Q24H    ceFEPIme  2 g Intravenous Q8H    clomiPRAMINE  100 mg Oral At Bedtime    [Held by provider] enoxaparin ANTICOAGULANT  40 mg Subcutaneous Q24H    [Held by provider] furosemide  20 mg Oral Daily    ipratropium - albuterol 0.5 mg/2.5 mg/3 mL  3 mL Nebulization 4x daily    levothyroxine  50 mcg Oral QAM AC    lidocaine  2 patch Transdermal Q24h    nicotine   Transdermal Q8H    pantoprazole  40 mg Oral BID    pravastatin  10 mg Oral Daily    propranolol ER  80 mg Oral Daily    sodium chloride (PF)  3 mL Intracatheter Q8H    vancomycin  2,000 mg Intravenous Q24H    venlafaxine  300 mg Oral Daily       Data   Recent Labs   Lab 09/06/23  0522 09/05/23  0539 09/04/23  0524 09/03/23  1407   WBC 17.0* 20.5* 19.1* 12.8*   HGB 12.8 12.0 12.2 13.3   MCV 89 92 91 90    348 376 329   INR  --   --   --  0.99    139 139 138   POTASSIUM 4.1 3.6 3.6 4.1   CHLORIDE 104 103 101 100   CO2 21* 23 25 25   BUN 14.6 13.3 8.5 9.3   CR 0.68 0.74 0.77 0.88   ANIONGAP 15 13 13 13   MESSI 9.0 9.0 8.8 9.4   * 116* 135* 214*   ALBUMIN  --   --   --  3.5   PROTTOTAL  --  6.9  --  7.1   BILITOTAL  --   --   --  0.2   ALKPHOS  --   --   --  101   ALT  --   --   --  21   AST  --   --   --  26            Recent Results (from the past 24 hour(s))   XR Chest 1 View     Narrative    Exam:  XR CHEST 1 VIEW    HISTORY: Post Thoracentesis.    COMPARISON:  9/3/2023, 3/20/2013    FINDINGS:     The cardiomediastinal contours are normal.      Small residual left pleural effusion status post thoracentesis. No  pneumothorax. There are streaky bibasilar opacities.      No acute osseous abnormality.       Impression    IMPRESSION:      Small residual left pleural effusion status post thoracentesis. No  pneumothorax.      FOREST BENTON MD         SYSTEM ID:  [id number]   US Thoracentesis    Narrative    PROCEDURE: US THORACENTESIS 9/5/2023 2:06 PM    HISTORY: Left sided effusion    COMPARISONS: None.    TECHNIQUE: Patient prepped and draped in a sterile manner. Using  lidocaine anesthesia a 5 [place] catheter was placed in the pleural  space on the left under ultrasound guidance 45 cc of thick yellow  fluid was removed and sent for laboratory examination.  Due to the extensive loculations significant drainage into the vacuum  bottle did not occur.    The patient tolerated procedure well.    SHEA ALLEN MD         SYSTEM ID:  [id number]       Kamini Roach MD

## 2023-09-06 NOTE — PLAN OF CARE
"/56 (BP Location: Right arm, Patient Position: Chair, Cuff Size: Adult Regular)   Pulse 79   Temp 97.5  F (36.4  C) (Tympanic)   Resp 20   Ht 1.549 m (5' 1\")   Wt 90.6 kg (199 lb 11.8 oz)   SpO2 94%   BMI 37.74 kg/m      Pt is now A&O although she did have some intermittent confusion last night after IV dilaudid admin. VSS, max temp 100.9, and she reported pain rated 2-8/10. PRN tylenol, toradol, and dilaudid given. Lidocaine patches in place on neck and left ribs. Dressing to puncture site is CDI. She is on 1 1/2  L of O2 via NC. Assessment as charted. IV is infusing NS TKO. Call light is within reach, chair alarm is on, non-slip footwear are on when she is OOB, and her room door is open.     Face to face report given with opportunity to observe patient.    Report given to SEAN Menard RN   9/6/2023  6:58 AM  "

## 2023-09-06 NOTE — PHARMACY-MEDICATION REGIMEN REVIEW
Pharmacy Antimicrobial Stewardship Assessment     Current Antimicrobial Therapy:  Anti-infectives (From now, onward)      Start     Dose/Rate Route Frequency Ordered Stop    23 0330  ceFEPIme (MAXIPIME) 2 g vial to attach to  mL bag for ADULTS or 50 mL bag for PEDS         2 g  over 30 Minutes Intravenous EVERY 8 HOURS 23 2100  azithromycin (ZITHROMAX) 500 mg in sodium chloride 0.9 % 250 mL intermittent infusion         500 mg  over 1 Hours Intravenous EVERY 24 HOURS 23 0900  vancomycin (VANCOCIN) 2,000 mg in 0.9% NaCl 500 mL intermittent infusion         2,000 mg  over 2 Hours Intravenous EVERY 24 HOURS 23 0819          Indication: CAP/Sepsis (Cefepime + Vanco), CAP (Azithromycin)    Days of Therapy:   Cefepime: day 3  Vanco: day 3  Azithromycin: day 4     Pertinent Labs:  Recent Labs   Lab Test 23  0522 23  0539 23  0524   WBC 17.0* 20.5* 19.1*     Recent Labs   Lab Test 23  0522 23  2222 23  0539 23  1631   LACT  --  1.0  --  1.7   PCAL 0.25*  --  0.29*  --         Temperature:  Temp (24hrs), Av  F (37.2  C), Min:97.5  F (36.4  C), Max:100.9  F (38.3  C)    Culture Results:       Recommendations/Interventions:  Getting Vanco level tomorrow AM. Many other labs still pending today. No recommendations at this time.     Sapphire Handley, [facility]  2023

## 2023-09-07 ENCOUNTER — APPOINTMENT (OUTPATIENT)
Dept: CT IMAGING | Facility: HOSPITAL | Age: 68
DRG: 193 | End: 2023-09-07
Attending: INTERNAL MEDICINE
Payer: COMMERCIAL

## 2023-09-07 VITALS
WEIGHT: 199.74 LBS | DIASTOLIC BLOOD PRESSURE: 63 MMHG | HEART RATE: 77 BPM | HEIGHT: 61 IN | TEMPERATURE: 100.1 F | SYSTOLIC BLOOD PRESSURE: 126 MMHG | RESPIRATION RATE: 20 BRPM | BODY MASS INDEX: 37.71 KG/M2 | OXYGEN SATURATION: 92 %

## 2023-09-07 LAB
ANION GAP SERPL CALCULATED.3IONS-SCNC: 13 MMOL/L (ref 7–15)
BUN SERPL-MCNC: 12.7 MG/DL (ref 8–23)
CALCIUM SERPL-MCNC: 8.7 MG/DL (ref 8.8–10.2)
CHLORIDE SERPL-SCNC: 104 MMOL/L (ref 98–107)
CREAT SERPL-MCNC: 0.66 MG/DL (ref 0.51–0.95)
CRP SERPL-MCNC: 420.36 MG/L
DEPRECATED HCO3 PLAS-SCNC: 23 MMOL/L (ref 22–29)
EGFRCR SERPLBLD CKD-EPI 2021: >90 ML/MIN/1.73M2
ERYTHROCYTE [DISTWIDTH] IN BLOOD BY AUTOMATED COUNT: 13 % (ref 10–15)
ERYTHROCYTE [SEDIMENTATION RATE] IN BLOOD BY WESTERGREN METHOD: 110 MM/HR (ref 0–30)
GLUCOSE SERPL-MCNC: 116 MG/DL (ref 70–99)
HCT VFR BLD AUTO: 33.6 % (ref 35–47)
HGB BLD-MCNC: 11 G/DL (ref 11.7–15.7)
MCH RBC QN AUTO: 30 PG (ref 26.5–33)
MCHC RBC AUTO-ENTMCNC: 32.7 G/DL (ref 31.5–36.5)
MCV RBC AUTO: 92 FL (ref 78–100)
PLATELET # BLD AUTO: 366 10E3/UL (ref 150–450)
POTASSIUM SERPL-SCNC: 3.6 MMOL/L (ref 3.4–5.3)
PROCALCITONIN SERPL IA-MCNC: 0.22 NG/ML
RBC # BLD AUTO: 3.67 10E6/UL (ref 3.8–5.2)
SODIUM SERPL-SCNC: 140 MMOL/L (ref 136–145)
VANCOMYCIN SERPL-MCNC: 8.3 UG/ML
WBC # BLD AUTO: 14.6 10E3/UL (ref 4–11)

## 2023-09-07 PROCEDURE — 71250 CT THORAX DX C-: CPT

## 2023-09-07 PROCEDURE — 80202 ASSAY OF VANCOMYCIN: CPT | Performed by: INTERNAL MEDICINE

## 2023-09-07 PROCEDURE — 250N000009 HC RX 250: Performed by: INTERNAL MEDICINE

## 2023-09-07 PROCEDURE — 86140 C-REACTIVE PROTEIN: CPT | Performed by: INTERNAL MEDICINE

## 2023-09-07 PROCEDURE — 80048 BASIC METABOLIC PNL TOTAL CA: CPT | Performed by: INTERNAL MEDICINE

## 2023-09-07 PROCEDURE — 85027 COMPLETE CBC AUTOMATED: CPT | Performed by: INTERNAL MEDICINE

## 2023-09-07 PROCEDURE — 250N000011 HC RX IP 250 OP 636: Mod: JZ | Performed by: INTERNAL MEDICINE

## 2023-09-07 PROCEDURE — 84145 PROCALCITONIN (PCT): CPT | Performed by: INTERNAL MEDICINE

## 2023-09-07 PROCEDURE — 94640 AIRWAY INHALATION TREATMENT: CPT

## 2023-09-07 PROCEDURE — 36415 COLL VENOUS BLD VENIPUNCTURE: CPT | Performed by: INTERNAL MEDICINE

## 2023-09-07 PROCEDURE — 85652 RBC SED RATE AUTOMATED: CPT | Performed by: INTERNAL MEDICINE

## 2023-09-07 PROCEDURE — 999N000157 HC STATISTIC RCP TIME EA 10 MIN

## 2023-09-07 PROCEDURE — 250N000013 HC RX MED GY IP 250 OP 250 PS 637: Performed by: INTERNAL MEDICINE

## 2023-09-07 PROCEDURE — 99239 HOSP IP/OBS DSCHRG MGMT >30: CPT | Performed by: INTERNAL MEDICINE

## 2023-09-07 PROCEDURE — 258N000003 HC RX IP 258 OP 636: Performed by: INTERNAL MEDICINE

## 2023-09-07 PROCEDURE — 94640 AIRWAY INHALATION TREATMENT: CPT | Mod: 76

## 2023-09-07 PROCEDURE — 2894A VOIDCORRECT: CPT | Performed by: INTERNAL MEDICINE

## 2023-09-07 RX ORDER — CLONAZEPAM 0.5 MG/1
0.5 TABLET ORAL 2 TIMES DAILY PRN
Status: DISCONTINUED | OUTPATIENT
Start: 2023-09-07 | End: 2023-09-07 | Stop reason: HOSPADM

## 2023-09-07 RX ADMIN — ACETAMINOPHEN 650 MG: 325 TABLET, FILM COATED ORAL at 13:47

## 2023-09-07 RX ADMIN — CEFEPIME 2 G: 2 INJECTION, POWDER, FOR SOLUTION INTRAVENOUS at 13:57

## 2023-09-07 RX ADMIN — VENLAFAXINE HYDROCHLORIDE 300 MG: 150 CAPSULE, EXTENDED RELEASE ORAL at 08:51

## 2023-09-07 RX ADMIN — KETOROLAC TROMETHAMINE 15 MG: 15 INJECTION, SOLUTION INTRAMUSCULAR; INTRAVENOUS at 14:52

## 2023-09-07 RX ADMIN — KETOROLAC TROMETHAMINE 15 MG: 15 INJECTION, SOLUTION INTRAMUSCULAR; INTRAVENOUS at 05:41

## 2023-09-07 RX ADMIN — ACETAMINOPHEN 650 MG: 325 TABLET, FILM COATED ORAL at 02:44

## 2023-09-07 RX ADMIN — IPRATROPIUM BROMIDE AND ALBUTEROL SULFATE 3 ML: .5; 3 SOLUTION RESPIRATORY (INHALATION) at 11:38

## 2023-09-07 RX ADMIN — PROPRANOLOL HYDROCHLORIDE 80 MG: 80 CAPSULE, EXTENDED RELEASE ORAL at 08:50

## 2023-09-07 RX ADMIN — IPRATROPIUM BROMIDE AND ALBUTEROL SULFATE 3 ML: .5; 3 SOLUTION RESPIRATORY (INHALATION) at 08:17

## 2023-09-07 RX ADMIN — VANCOMYCIN HYDROCHLORIDE 1500 MG: 1 INJECTION, POWDER, LYOPHILIZED, FOR SOLUTION INTRAVENOUS at 10:11

## 2023-09-07 RX ADMIN — CEFEPIME 2 G: 2 INJECTION, POWDER, FOR SOLUTION INTRAVENOUS at 05:41

## 2023-09-07 RX ADMIN — OXYCODONE HYDROCHLORIDE 5 MG: 5 TABLET ORAL at 02:44

## 2023-09-07 RX ADMIN — CLONAZEPAM 0.5 MG: 0.5 TABLET ORAL at 14:51

## 2023-09-07 RX ADMIN — PANTOPRAZOLE SODIUM 40 MG: 40 TABLET, DELAYED RELEASE ORAL at 08:51

## 2023-09-07 RX ADMIN — OXYCODONE HYDROCHLORIDE 5 MG: 5 TABLET ORAL at 13:47

## 2023-09-07 RX ADMIN — PRAVASTATIN SODIUM 10 MG: 10 TABLET ORAL at 08:50

## 2023-09-07 RX ADMIN — LEVOTHYROXINE SODIUM 50 MCG: 0.05 TABLET ORAL at 05:42

## 2023-09-07 ASSESSMENT — ACTIVITIES OF DAILY LIVING (ADL)
ADLS_ACUITY_SCORE: 32
ADLS_ACUITY_SCORE: 35
ADLS_ACUITY_SCORE: 35
ADLS_ACUITY_SCORE: 31
ADLS_ACUITY_SCORE: 32
ADLS_ACUITY_SCORE: 31
ADLS_ACUITY_SCORE: 31
ADLS_ACUITY_SCORE: 35
ADLS_ACUITY_SCORE: 35

## 2023-09-07 NOTE — DISCHARGE SUMMARY
[facility]    Discharge Summary  Hospitalist    Date of Admission:  9/3/2023  Date of Transfer:  9/7/2023  Discharging Provider: Kamini Roach MD, MD  Date of Service (when I saw the patient): 09/07/23    Discharge Diagnoses   Principal Problem:    Pneumonia of both lungs due to infectious organism, unspecified part of lung  Empyema  Acute hypoxic respiratory failure  Essential hypertension  Diabetes mellitus, new diagnosis with hemoglobin A1c of 7.4  Anxiety/depression  Hyperlipidemia  Hypothyroidism.  Chronic bilateral lower leg edema  Tobacco dependence      History of Present Illness   Tianna Lobato is an 68 year old female who presented with pleuritic chest pain, shortness of breath.  Please see admission H+P for additional details.    Hospital Course   Tianna Lobato was admitted on 9/3/2023.  68 year old female with history of hypertension, hyperlipidemia, hypothyroidism tobacco dependence, obesity, as well as pulmonary embolism about 10 years ago but no longer on anticoagulation who presents with chest pain and shortness of breath for about 2 and half weeks.  Patient complains of back pain that is most prominent.  She admits to shortness of breath only on questioning.  She states that she is a smoker, less than a pack a day.  She was found to be febrile, minimally hypoxic.  Chest CT negative for pulmonary embolism, but did show moderate left-sided effusion as well as opacities bilaterally.  Patient was admitted and treated empirically for community-acquired pneumonia.  Patient continued to be febrile with elevated inflammatory markers including CRP, procalcitonin, so decision was made to perform left-sided thoracentesis on 9/5/2023 for both diagnostic and therapeutic purposes.  Only approximately 50 cc of fluid removed, which was cloudy in appearance.  It proved to be exudative, with a pH of 7.0.  Pleural fluid cultures and cytology are still pending at time of transfer.  Blood cultures  showing no growth to date.  Patient continues to be febrile.  Antibiotic coverage was expanded to cefepime and vancomycin along with azithromycin.  Patient continues to be febrile, inflammatory markers persistently elevated.  Repeat CT scan done after thoracentesis on 9/7/2023 reveals reaccumulation of loculated appearing pleural effusion, perhaps somewhat worse than previous.  At this point, call was placed to pulmonology at [place], Dr. eRina Fagan accepts the patient for possible chest tube placement, consultation by cardiothoracic surgery for possible VATS.    Kamini Roach MD      Significant Results and Procedures   See below.    Pending Results   These results will be followed up by Caitlin Kendrick    Unresulted Labs Ordered in the Past 30 Days of this Admission       Date and Time Order Name Status Description    9/5/2023  1:37 PM Cytology, non-gynecologic In process     9/5/2023  1:37 PM Pleural fluid Aerobic Bacterial Culture Routine Preliminary     9/3/2023  3:43 PM Blood Culture Hand, Right Preliminary     9/3/2023  3:43 PM Blood Culture Arm, Right Preliminary             Code Status   Full Code       Primary Care Physician   Caitlin Kendrick    Physical Exam   Temp: 100.1  F (37.8  C) Temp src: Tympanic BP: 126/63 Pulse: 77   Resp: 20 SpO2: 92 % O2 Device: None (Room air) Oxygen Delivery: 1/2 LPM  Vitals:    09/04/23 0315 09/05/23 0313 09/06/23 0531   Weight: 87.7 kg (193 lb 5.5 oz) 90.4 kg (199 lb 4.7 oz) 90.6 kg (199 lb 11.8 oz)     Vital Signs with Ranges  Temp:  [97.3  F (36.3  C)-100.1  F (37.8  C)] 100.1  F (37.8  C)  Pulse:  [74-86] 77  Resp:  [20] 20  BP: (105-142)/(60-68) 126/63  SpO2:  [89 %-95 %] 92 %  I/O last 3 completed shifts:  In: 510 [P.O.:510]  Out: 100 [Urine:100]    Constitutional: AA, NAD  Eyes: PERRLA, no injection, no icterus  HEENT: atraumatic, normocephalic  Respiratory: CTA b/l  Cardiovascular: S1 S2 RRR  GI: soft, NT, ND, + bowel sounds  Lymph/Hematologic: no palpable  lymphadenopathy  Skin: no rashes, no lesions  Musculoskeletal: No edema, good tone, no deformities  Neurologic: oriented x 3, no focal deficits  Psychiatric: appropriate affect    Discharge Disposition   Transferred to [facility]  Condition at discharge: Guarded    Consultations This Hospital Stay   PHARMACY TO DOSE VANCO    Time Spent on this Encounter   Kamini HALL MD, personally saw the patient today and spent greater than 30 minutes discharging this patient.    Discharge Orders   No discharge procedures on file.  Discharge Medications   Current Discharge Medication List        CONTINUE these medications which have NOT CHANGED    Details   Calcium Citrate (CITRACAL OR) Take 1,200 mg by mouth At Bedtime (With magnesium and vitamin d3)      clomiPRAMINE (ANAFRANIL) 50 MG capsule Take 100 mg by mouth At Bedtime      cyanocobalamin (VITAMIN B-12) 1000 MCG tablet Take 1,000 mcg by mouth every other day -morning      furosemide (LASIX) 20 MG tablet Take 1 tablet by mouth daily at 2 pm      hydrOXYzine (ATARAX) 25 MG tablet Take 50 mg by mouth 4 times daily as needed for anxiety      Krill Oil 300 MG CAPS Take 300 mg by mouth every morning      levothyroxine (SYNTHROID/LEVOTHROID) 50 MCG tablet Take 1 tablet by mouth daily before breakfast      omeprazole (PRILOSEC) 40 MG DR capsule Take 40 mg by mouth every morning      pravastatin (PRAVACHOL) 10 MG tablet Take 1 tablet by mouth every morning      propranolol ER (INDERAL LA) 80 MG 24 hr capsule Take 80 mg by mouth every morning      temazepam (RESTORIL) 30 MG capsule Take 30 mg by mouth nightly as needed for sleep      venlafaxine (EFFEXOR XR) 150 MG 24 hr capsule Take 2 capsules by mouth every morning (before breakfast)           Allergies   Allergies   Allergen Reactions    Sulfa Antibiotics Hives    Adhesive Tape Itching and Rash     Data   Most Recent 3 CBC's:  Recent Labs   Lab Test 09/07/23  0618 09/06/23  0522 09/05/23  0539   WBC  14.6* 17.0* 20.5*   HGB 11.0* 12.8 12.0   MCV 92 89 92    352 348      Most Recent 3 BMP's:  Recent Labs   Lab Test 09/07/23  0618 09/06/23  0522 09/05/23  0539    140 139   POTASSIUM 3.6 4.1 3.6   CHLORIDE 104 104 103   CO2 23 21* 23   BUN 12.7 14.6 13.3   CR 0.66 0.68 0.74   ANIONGAP 13 15 13   MESSI 8.7* 9.0 9.0   * 120* 116*     Most Recent 2 LFT's:  Recent Labs   Lab Test 09/03/23  1407   AST 26   ALT 21   ALKPHOS 101   BILITOTAL 0.2     Most Recent INR's and Anticoagulation Dosing History:  Anticoagulation Dose History          Latest Ref Rng & Units 3/20/2013 9/3/2023   Recent Dosing and Labs   INR 0.85 - 1.15 1.0  0.99      Most Recent 3 Troponin's:No lab results found.  Most Recent Cholesterol Panel:No lab results found.  Most Recent 6 Bacteria Isolates From Any Culture (See EPIC Reports for Culture Details):No lab results found.  Most Recent TSH, T4 and A1c Labs:  Recent Labs   Lab Test 09/03/23  1407   A1C 7.4*     Results for orders placed or performed during the hospital encounter of 09/03/23   CTA Chest with Contrast    Narrative    CTA CHEST WITH CONTRAST  9/3/2023 3:06 PM    CLINICAL HISTORY: Female, age 68 years,  pleuritic left chest pain;    Comparison:  No relevant prior imaging.    TECHNIQUE:  CT angiogram was performed of the chest with IV contrast.  Axial, sagittal and coronal images were reviewed. MIP and/or 3-D  images were constructed by the technologist.    FINDINGS:   The heart and great vessels demonstrate no evidence of acute  abnormality. No distinct evidence of pulmonary embolus or aortic  dissection.    The lungs demonstrate a number of nodular areas of consolidation. A  moderately complex, loculated left-sided effusion is present.    There are number of normal-sized and mildly enlarged lymph nodes  throughout the mediastinum and hilar regions. The left esophagus is  unremarkable.    Visualized portions of the upper abdomen demonstrate  hepatic  steatosis.    Bilateral breast implants are present.    Bony structures demonstrate no acute abnormality. Healed left seventh  rib fracture.     Sternum is intact.      Impression    IMPRESSION:   No acute vascular abnormality. No evidence of pulmonary embolus or  aortic dissection.    Mediastinal and hilar lymphadenopathy with sites of nodular  consolidation throughout the parenchyma both lungs suggesting  infectious/inflammatory pneumonia with reactive lymphadenopathy.  Differential diagnosis also includes neoplasm.    Small to moderate size, complex left-sided pleural effusion.    This report is in agreement with the preliminary report.      This facility minimizes radiation dose by adjusting the mA and/or kV  according to each patient size.      This CT scan was performed using one or more the following dose  reduction techniques:    -Automated exposure control,  -Adjustment of the mA and/or kV according to patient's size, and/or,  -Use of iterative reconstruction technique.    MILAN HITCHCOCK MD         SYSTEM ID:  [id number]   US Thoracentesis    Narrative    PROCEDURE: US THORACENTESIS 9/5/2023 2:06 PM    HISTORY: Left sided effusion    COMPARISONS: None.    TECHNIQUE: Patient prepped and draped in a sterile manner. Using  lidocaine anesthesia a 5 [place] catheter was placed in the pleural  space on the left under ultrasound guidance 45 cc of thick yellow  fluid was removed and sent for laboratory examination.  Due to the extensive loculations significant drainage into the vacuum  bottle did not occur.    The patient tolerated procedure well.    SHEA ALLEN MD         SYSTEM ID:  [id number]   XR Chest 1 View    Narrative    Exam:  XR CHEST 1 VIEW    HISTORY: Post Thoracentesis.    COMPARISON:  9/3/2023, 3/20/2013    FINDINGS:     The cardiomediastinal contours are normal.      Small residual left pleural effusion status post thoracentesis. No  pneumothorax. There are streaky bibasilar opacities.       No acute osseous abnormality.       Impression    IMPRESSION:      Small residual left pleural effusion status post thoracentesis. No  pneumothorax.      FOREST BENTON MD         SYSTEM ID:  [id number]   CT Chest w/o Contrast    Narrative    EXAMINATION: CT CHEST W/O CONTRAST, 9/7/2023 9:16 AM    COMPARISON: Chest radiograph 9/5/2023; CT chest 9/3/2023    HISTORY: f/u left sided effusion    TECHNIQUE:  Imaging protocol: Computed tomography images of the chest without  contrast.   Acquisition: This CT exam was performed using one or more the  following dose reduction techniques: automated exposure control,  adjustment of the mA and/or kV according to patient size, and/or  iterative reconstruction technique.  Processing: 3D rendering on independent workstation using Maximum  Intensity Projection (MIP) was performed and archived to PACS. 3D  reconstructions are interpreted and reported by supervising  radiologist.    FINDINGS:    CHEST:  LUNGS: Nodular consolidations in the right lower lobe measuring 1.4 x  1.2 cm (series 3 image 132 and 2.3 x 1.7 cm (series 3 image 138),  similar to comparison. Similar nodular consolidation in the medial  right upper lobe. Increased patchy groundglass opacities in the right  lung. Increased left lung volume loss and consolidation. Interlobular  septal thickening.  PLEURA: Increased loculated left pleural effusion. No substantial  right pleural effusion. No pneumothorax.  VESSELS: Mild atherosclerotic calcification of the aorta without  aneurysmal dilation.  HEART: No cardiomegaly. Mild coronary calcification.  LYMPH NODES: Prominent to enlarged mediastinal lymph nodes.  THYROID: No thyroid nodules.    BONES AND SOFT TISSUES:  No suspicious osseous lesions. Degenerative periarticular changes.  Bilateral breast implants..    UPPER ABDOMEN:  Limited evaluation of the upper abdomen demonstrates no acute  parenchymal abnormalities, nonobstructive bowel gas pattern, and no  free fluid  or free air. Hepatic steatosis. Layering density in the  gallbladder, likely representing sludge or small stones.      Impression    IMPRESSION:  1.  Increased loculated left pleural effusion with associated  increased left lung volume loss and consolidation.  2.  Increased right lung ground glass opacities, concerning for  worsening infection.  3.  Stable multifocal nodular pulmonary consolidations, favored  sequela of infection. Recommend follow-up to resolution.    JAYNE WATSON MD         SYSTEM ID:  [id number]        Quinolones Counseling:  I discussed with the patient the risks of fluoroquinolones including but not limited to GI upset, allergic reaction, drug rash, diarrhea, dizziness, photosensitivity, yeast infections, liver function test abnormalities, tendonitis/tendon rupture.

## 2023-09-07 NOTE — PHARMACY-MEDICATION REGIMEN REVIEW
Pharmacy Antimicrobial Stewardship Assessment     Current Antimicrobial Therapy:  Anti-infectives (From now, onward)      Start     Dose/Rate Route Frequency Ordered Stop    23 0330  ceFEPIme (MAXIPIME) 2 g vial to attach to  mL bag for ADULTS or 50 mL bag for PEDS         2 g  over 30 Minutes Intravenous EVERY 8 HOURS 23 2100  azithromycin (ZITHROMAX) 500 mg in sodium chloride 0.9 % 250 mL intermittent infusion         500 mg  over 1 Hours Intravenous EVERY 24 HOURS 23 0900  vancomycin (VANCOCIN) 2,000 mg in 0.9% NaCl 500 mL intermittent infusion         2,000 mg  over 2 Hours Intravenous EVERY 24 HOURS 23 0819          Indication: CAP/Sepsis (Cefepime+Vancomycin), CAP(Zithromax)    Days of Therapy:   Cefepime: day 4  Vancomycin: day 4  Azithromycin: day 5     Pertinent Labs:  Recent Labs   Lab Test 23  0539   WBC 14.6* 17.0* 20.5*     Recent Labs   Lab Test 23  0618 23  0522 23  2222 23  0539 23  1631   LACT  --   --  1.0  --  1.7   *  --   --   --   --    PCAL 0.22* 0.25*  --    < >  --     < > = values in this interval not displayed.          Temperature:  Temp (24hrs), Av.4  F (36.9  C), Min:97.3  F (36.3  C), Max:99.9  F (37.7  C)    Culture Results:       Recommendations/Interventions:  Preliminary pleural fluid culture shows no growth to date.   Cefepime is a restricted agent- recommend switching to Zosyn.   Could likely de-escalate/stop Vanco as well since we're on day 4 of therapy and no bug we are specifically targeting. However, I am not sure if/what infection we are truly treating. Patient might be getting chest tube. Will continue to monitor.     Sapphire Handley, [facility]  2023

## 2023-09-07 NOTE — PHARMACY-VANCOMYCIN DOSING SERVICE
Pharmacy Vancomycin Note  Date of Service 2023  Patient's  1955   68 year old, female    Indication: Community Acquired Pneumonia and Sepsis  Day of Therapy: 4  Current vancomycin regimen:  2000 mg IV q24h  Current vancomycin monitoring method: AUC  Current vancomycin therapeutic monitoring goal: 400-600 mg*h/L    Insight[employer] Prediction of Current Vancomycin Regimen  Loading dose: N/A  Regimen: 2000 mg IV every 24 hours.  Start time: 09:55 on 2023  Exposure target: AUC24 (range)400-600 mg/L.hr   AUC24,ss: 379 mg/L.hr  Probability of AUC24 > 400: 38 %  Ctrough,ss: 7.7 mg/L  Probability of Ctrough,ss > 20: 0 %  Probability of nephrotoxicity (Lodise SLIM ): 4 %    Current estimated CrCl = Estimated Creatinine Clearance: 81.3 mL/min (based on SCr of 0.66 mg/dL).    Creatinine for last 3 days  2023:  5:39 AM Creatinine 0.74 mg/dL  2023:  5:22 AM Creatinine 0.68 mg/dL  2023:  6:18 AM Creatinine 0.66 mg/dL    Recent Vancomycin Levels (past 3 days)  2023:  6:18 AM Vancomycin 8.3 ug/mL    Vancomycin IV Administrations (past 72 hours)                     vancomycin (VANCOCIN) 2,000 mg in 0.9% NaCl 500 mL intermittent infusion (mg) 2,000 mg New Bag 23 0955     2,000 mg New Bag 23 1018     2,000 mg New Bag 23 1022                    Nephrotoxins and other renal medications (From now, onward)      Start     Dose/Rate Route Frequency Ordered Stop    23 0900  vancomycin (VANCOCIN) 2,000 mg in 0.9% NaCl 500 mL intermittent infusion         2,000 mg  over 2 Hours Intravenous EVERY 24 HOURS 23 0819      23 1730  [Held by provider]  furosemide (LASIX) tablet 20 mg        (Held by provider since Sun 9/3/2023 at 1718 by Nirali Garcia RN.Hold Reason: Other)   Note to Pharmacy: PTA Sig:Take 1 tablet by mouth daily      20 mg Oral DAILY 23 1718      23 1718  ketorolac (TORADOL) injection 15 mg         15 mg Intravenous EVERY 6 HOURS PRN 23 1717  09/08/23 1717               Contrast Orders - past 72 hours (72h ago, onward)      None            Interpretation of levels and current regimen:  Vancomycin level is reflective of AUC less than 400    Has serum creatinine changed greater than 50% in last 72 hours: No    Urine output:  good urine output    Renal Function: Stable    Insight[employer] Prediction of Planned New Vancomycin Regimen  Loading dose: N/A  Regimen: 1500 mg IV every 12 hours.  Start time: 09:55 on 09/07/2023  Exposure target: AUC24 (range)400-600 mg/L.hr   AUC24,ss: 565 mg/L.hr  Probability of AUC24 > 400: 97 %  Ctrough,ss: 16.2 mg/L  Probability of Ctrough,ss > 20: 17 %  Probability of nephrotoxicity (Lodise SLIM 2009): 12 %      Plan:  Increase Dose to 1500 mg IV q12h  Vancomycin monitoring method: AUC  Vancomycin therapeutic monitoring goal: 400-600 mg*h/L  Pharmacy will check vancomycin levels as appropriate in 1-3 Days.  Serum creatinine levels will be ordered daily for the first week of therapy and at least twice weekly for subsequent weeks.    Sapphire Handley, [facility]

## 2023-09-07 NOTE — CARE PLAN
"Most recent vitals: /54   Pulse 80   Temp 99.9  F (37.67 C)   Resp 20   Ht 1.549 m (5' 1\")   Wt 90.6 kg (199 lb 11.8 oz)   SpO2 93%   BMI 37.74 kg/m      Comments:     A/O, cooperative. VSS, afebrile. Pain 3-8/10 (increased confusion with dilaudid.  Provider notified and PO oxy ordered).  Patient continues to have diminished, coarse breath sounds bilaterally.  Has exp wheezes @ times w/ pleural rub noted. IV remains intact entire shift.  IV Vanco, cefepime, & azithromycin given as ordered. Old infiltration sites unremarkable with minor bruising noted.      Patient due to have CT in morning some time.  Possible Chest tube candidate.     Face to face report given with opportunity to observe patient.    Report given to Pauline MCKOY RN    9/6/2023  7:40 PM  Sailaja Hutton RN    "

## 2023-09-07 NOTE — PLAN OF CARE
"Patient was transferred to [facility] at approximately 3:22 PM via [facility].   Patient status stable. Patient's most recent vitals: Blood pressure 126/63, pulse 77, temperature 100.1  F (37.8  C), temperature source Tympanic, resp. rate 20, height 1.549 m (5' 1\"), weight 90.6 kg (199 lb 11.8 oz), SpO2 92 %.  Pt transferred with intact IV.Yes  Intact IV site at Right lower forearm  Pt transferred with oxygen. .No     Other LDA\"s: N/A  Belongings were sent with Patient  AVS and pertinent records sent with patient. .Yes   Report given to charity [facility] in SBAR format.      In the morning patient was on 1l o2 nasal cannula. Nurse weaned off o2 and o2 sats stayed at 92%. Pain in the morning was 2 to 3 and then in early afternoon needed prn pain medication because level was at 7-8. Ate 75% of brunch meal.    "

## 2023-09-07 NOTE — PLAN OF CARE
VSS. Lung sounds diminished with expiratory wheezes and fine crackles in mid and lower right lobe. Sats 90-92% on 1lpm via nc, drops below 90% when on room air and with exertion. Oxy and Tylenol given x2 and Toradol x1 so far this shift, received dilaudid x1 for pain rated 9/10. Pain reported left breast fold area wrapping around rib area, left side of neck, and back. Alert and orientated and calls for assist appropriately, up ad zuleyka independently. See PCS charting for full assessment.  Face to face report given with opportunity to observe patient.    Report given to Gerald Pettit RN   9/7/2023  7:02 AM

## 2023-09-08 LAB
PATH REPORT.FINAL DX SPEC: NORMAL
PATH REPORT.GROSS SPEC: NORMAL
PATH REPORT.MICROSCOPIC SPEC OTHER STN: NORMAL

## 2023-09-09 LAB
BACTERIA BLD CULT: NO GROWTH
BACTERIA BLD CULT: NO GROWTH

## 2023-09-12 LAB — BACTERIA PLR CULT: NO GROWTH

## 2023-10-08 ENCOUNTER — HEALTH MAINTENANCE LETTER (OUTPATIENT)
Age: 68
End: 2023-10-08

## 2024-05-02 NOTE — PLAN OF CARE
[facility] Inpatient Admission Note:    Patient admitted to 3112/3112-1 at approximately 1720     via cart accompanied by transport tech from emergency room . Report received from  ER RN in SBAR format at \[phone] via telephone. Patient transferred to bed via self.. Patient is alert and oriented X 3, denies pain; rates at 0 on 0-10 scale.  Patient oriented to room, unit, hourly rounding, and plan of care. Explained admission packet and patient handbook with patient bill of rights brochure. Will continue to monitor and document as needed.     Inpatient Nursing criteria listed below was met:    Health care directives status obtained and documented: Yes    Patient identifies a surrogate decision maker: Yes If yes, who:see index Contact Information:seew index     If initial lactic acid greater than 2.0, repeat lactic acid drawn within one hour of arrival to unit: NA. If no, state reason: n/a    Clergy visit ordered if patient requests: N/A    Skin issues/needs documented: N/A    Isolation Patient: no Education given, correct sign in place and documentation row added to PCS:  No    Fall Prevention Yes: Care plan updated, education given and documented, sticker and magnet in place: Yes    Care Plan initiated: Yes    Education Documented (including assessment): Yes    Patient has discharge needs : No If yes, please explain:                          none

## 2024-11-30 ENCOUNTER — HEALTH MAINTENANCE LETTER (OUTPATIENT)
Age: 69
End: 2024-11-30